# Patient Record
Sex: FEMALE | Race: WHITE | NOT HISPANIC OR LATINO | Employment: OTHER | URBAN - METROPOLITAN AREA
[De-identification: names, ages, dates, MRNs, and addresses within clinical notes are randomized per-mention and may not be internally consistent; named-entity substitution may affect disease eponyms.]

---

## 2018-02-02 ENCOUNTER — OFFICE VISIT (OUTPATIENT)
Dept: ORTHOPEDIC SURGERY | Facility: CLINIC | Age: 78
End: 2018-02-02

## 2018-02-02 VITALS — BODY MASS INDEX: 29.59 KG/M2 | WEIGHT: 160.8 LBS | TEMPERATURE: 98 F | HEIGHT: 62 IN

## 2018-02-02 DIAGNOSIS — M25.551 HIP PAIN, RIGHT: Primary | ICD-10-CM

## 2018-02-02 DIAGNOSIS — M16.11 PRIMARY OSTEOARTHRITIS OF RIGHT HIP: ICD-10-CM

## 2018-02-02 PROCEDURE — 73502 X-RAY EXAM HIP UNI 2-3 VIEWS: CPT | Performed by: ORTHOPAEDIC SURGERY

## 2018-02-02 PROCEDURE — 99204 OFFICE O/P NEW MOD 45 MIN: CPT | Performed by: ORTHOPAEDIC SURGERY

## 2018-02-02 RX ORDER — HYDROCODONE BITARTRATE AND ACETAMINOPHEN 7.5; 325 MG/1; MG/1
1 TABLET ORAL
COMMUNITY
Start: 2017-12-19 | End: 2018-04-12

## 2018-02-02 RX ORDER — PRAVASTATIN SODIUM 40 MG
40 TABLET ORAL NIGHTLY
COMMUNITY
Start: 2017-11-06

## 2018-02-02 RX ORDER — INSULIN GLARGINE 100 [IU]/ML
19 INJECTION, SOLUTION SUBCUTANEOUS
COMMUNITY
Start: 2017-09-06 | End: 2018-02-02 | Stop reason: SDUPTHER

## 2018-02-02 NOTE — PROGRESS NOTES
Patient: Kyler Horne  YOB: 1940 78 y.o. female  Medical Record Number: [id number]    Chief Complaints:   Chief Complaint   Patient presents with   • Right Hip - Establish Care, Pain       History of Present Illness:Kyler Horne is a 78 y.o. female who presents with  Complaints of severe intermittent and moderately severe constant right groin pain.  It has been ongoing now for about 2 years it has progressively worsened over the last few months.  She describes a stabbing aching pain within the groin.  He has trouble putting shoes and socks on getting in and out of a car.  She now can only walk short distances.  She is using a cane due to the pain.  She is seen Dr. Jeff Hough who diagnosed her with lumbar spinal stenosis and sent her for lumbar epidurals without relief.  She also saw Kong Howell at [facility] for physical therapy.  Despite these measures she still has had persistent worsening pain which limits her basic activities of daily living.    Allergies:   Allergies   Allergen Reactions   • Clavulanic Acid Itching       Medications:   Current Outpatient Prescriptions   Medication Sig Dispense Refill   • Acetaminophen (TYLENOL EXTRA STRENGTH PO) Take  by mouth.     • amLODIPine-benazepril (LOTREL 5-10) 5-10 MG per capsule Take 1 capsule by mouth daily.     • carvedilol (COREG) 3.125 MG tablet Take 3.125 mg by mouth 2 (two) times a day with meals.     • Cholecalciferol 1000 units chewable tablet Chew 5,000 Units.     • colesevelam (WELCHOL) 3.75 G pack pack Take  by mouth daily.     • HYDROcodone-acetaminophen (NORCO) 7.5-325 MG per tablet Take 1 tablet by mouth.     • insulin glargine (LANTUS) 100 UNIT/ML injection Inject  under the skin daily.     • levothyroxine (SYNTHROID, LEVOTHROID) 100 MCG tablet Take 100 mcg by mouth daily.     • lisinopril-hydrochlorothiazide (PRINZIDE,ZESTORETIC) 20-12.5 MG per tablet Take 1 tablet by mouth daily.     • loperamide (IMODIUM) 1 MG/5ML solution Take  by  "mouth 4 (four) times a day as needed for diarrhea.     • pantoprazole (PROTONIX) 40 MG EC tablet Take 40 mg by mouth daily.     • pravastatin (PRAVACHOL) 40 MG tablet      • warfarin (COUMADIN) 2.5 MG tablet Take 2.5 mg by mouth daily.       No current facility-administered medications for this visit.          The following portions of the patient's history were reviewed and updated as appropriate: allergies, current medications, past family history, past medical history, past social history, past surgical history and problem list.    Review of Systems:   A 14 point review of systems was performed. All systems negative except pertinent positives/negative listed in HPI above    Physical Exam:   Vitals:    02/02/18 1450   Temp: 98 °F (36.7 °C)   TempSrc: Temporal Artery    Weight: 72.9 kg (160 lb 12.8 oz)   Height: 157.5 cm (62\")       General: A and O x 3, ASA, NAD    SCLERA:    Normal    DENTITION:   Normal   Hip:  right    LEG ALIGNMENT:     Neutral        LEG LENGTH DISCREPANCY   :    none    GAIT:     Antalgic    SKIN:     No abnormality    RANGE OF MOTION:     Limited by joint irritability    STRENGTH:     Limited by joint irratibility    DISTAL PULSES:    Paplable    DISTAL SENSATION :   Intact    LYMPHATICS:     No   lymphadenopathy    OTHER:          +   Stinchfeld test      -    log roll      -   Tenderness to palpation trochanteric bursa       Radiology:  Xrays 2views right hip (AP bilateral hips, and lateral of the hip) ordered and reviewed for evaluation of hip pain  demonstrating  advanced, end-satge osteoarthritis with bone on bone articluation, periarticular osteophytes, and subchondral cysts. There are no previous films for comparision.    Assessment/Plan: Severe end-stage right hip osteoarthritis she has failed the full complement of conservative measures.  Continuation of conservative management vs. YOVANY discussed.  The patient wishes to proceed with total hip replacement.  At this point the patient " has failed the full gamut of conservative treatment and stating complete understanding of the risks/benefits/ anternatives wishes to proceed with surgical treatment.    Risk and benefits of surgery were reviewed.  Including, but not limited to, blood clots, anesthesia risk, infection, leg length discrepancy, fracture, skin/leg numbness, failure of the implant, need for future surgeries, continued pain, hematoma, need for transfusion, and death, among others.  The patient understands and wishes to proceed.     The spectrum of treatment options were discussed with the patient in detail including both the nonoperative and operative treatment modalities and their respective risks and benefits.  After thorough discussion, the patient has elected to undergo surgical treatment.  The details of the surgical procedure were explained including the location of probable incisions and a description of the likely implants to be used.  Models and diagrams were used as educational resources. The patient understands the likely convalescence after surgery, as well as the rehabilitation required.  We thoroughly discussed the risks, benefits, and alternatives to surgery.  The risks include but are not limited to the risk of infection, joint stiffness, blood clots (including DVT and/or pulmonary embolus along with the risk of death), neurologic and/or vascular injury, fracture, dislocation, nonunion, malunion, need for further surgery including hardware failure requiring revision, and continued pain.  It was explained that if tissue has been repaired or reconstructed, there is also a chance of failure which may require further management.  Following the completion of the discussion, the patient expressed understanding of this planned course of care, all their questions were answered and consent will be obtained preoperatively.    Operative Plan: Posterior approach Total Hip Replacement given her history of diabetes and very soft thin  skin in the anterior hip region.  I plan on 2 day hospital stay given her history of clotting within her small bowel.  I will plan on sending her home after 2 days  with home health rehab.  We would ultrasound her legs prior to discharge home and she'll be discharged on Coumadin.  She will call back when she is ready to schedule.  I've asked that she see Dr. Reich for medical clearance and also clearance to come off Coumadin for 5 days preop.        Del Dumont MD  2/2/2018

## 2018-03-22 ENCOUNTER — TELEPHONE (OUTPATIENT)
Dept: ORTHOPEDIC SURGERY | Facility: CLINIC | Age: 78
End: 2018-03-22

## 2018-03-28 ENCOUNTER — TELEPHONE (OUTPATIENT)
Dept: ORTHOPEDIC SURGERY | Facility: CLINIC | Age: 78
End: 2018-03-28

## 2018-03-30 ENCOUNTER — TELEPHONE (OUTPATIENT)
Dept: ORTHOPEDIC SURGERY | Facility: CLINIC | Age: 78
End: 2018-03-30

## 2018-03-30 DIAGNOSIS — M16.11 PRIMARY OSTEOARTHRITIS OF RIGHT HIP: Primary | ICD-10-CM

## 2018-03-30 RX ORDER — PREGABALIN 25 MG/1
150 CAPSULE ORAL ONCE
Status: CANCELLED | OUTPATIENT
Start: 2018-03-30 | End: 2018-03-30

## 2018-03-30 RX ORDER — MELOXICAM 7.5 MG/1
15 TABLET ORAL ONCE
Status: CANCELLED | OUTPATIENT
Start: 2018-03-30 | End: 2018-03-30

## 2018-04-03 PROBLEM — M16.11 PRIMARY OSTEOARTHRITIS OF RIGHT HIP: Status: ACTIVE | Noted: 2018-04-03

## 2018-04-12 ENCOUNTER — APPOINTMENT (OUTPATIENT)
Dept: PREADMISSION TESTING | Facility: HOSPITAL | Age: 78
End: 2018-04-12

## 2018-04-12 VITALS
DIASTOLIC BLOOD PRESSURE: 81 MMHG | SYSTOLIC BLOOD PRESSURE: 148 MMHG | OXYGEN SATURATION: 95 % | TEMPERATURE: 98.2 F | HEIGHT: 62 IN | RESPIRATION RATE: 16 BRPM | HEART RATE: 73 BPM | WEIGHT: 165 LBS | BODY MASS INDEX: 30.36 KG/M2

## 2018-04-12 DIAGNOSIS — M16.11 PRIMARY OSTEOARTHRITIS OF RIGHT HIP: ICD-10-CM

## 2018-04-12 LAB
ANION GAP SERPL CALCULATED.3IONS-SCNC: 12.9 MMOL/L
BACTERIA UR QL AUTO: ABNORMAL /HPF
BILIRUB UR QL STRIP: NEGATIVE
BUN BLD-MCNC: 20 MG/DL (ref 8–23)
BUN/CREAT SERPL: 21.1 (ref 7–25)
CALCIUM SPEC-SCNC: 9.2 MG/DL (ref 8.6–10.5)
CHLORIDE SERPL-SCNC: 104 MMOL/L (ref 98–107)
CLARITY UR: CLEAR
CO2 SERPL-SCNC: 27.1 MMOL/L (ref 22–29)
COLOR UR: YELLOW
CREAT BLD-MCNC: 0.95 MG/DL (ref 0.57–1)
DEPRECATED RDW RBC AUTO: 54.4 FL (ref 37–54)
ERYTHROCYTE [DISTWIDTH] IN BLOOD BY AUTOMATED COUNT: 14.2 % (ref 11.7–13)
GFR SERPL CREATININE-BSD FRML MDRD: 57 ML/MIN/1.73
GLUCOSE BLD-MCNC: 177 MG/DL (ref 65–99)
GLUCOSE UR STRIP-MCNC: NEGATIVE MG/DL
HCT VFR BLD AUTO: 48.8 % (ref 35.6–45.5)
HGB BLD-MCNC: 15.1 G/DL (ref 11.9–15.5)
HGB UR QL STRIP.AUTO: NEGATIVE
HYALINE CASTS UR QL AUTO: ABNORMAL /LPF
INR PPP: 1.96 (ref 0.9–1.1)
KETONES UR QL STRIP: NEGATIVE
LEUKOCYTE ESTERASE UR QL STRIP.AUTO: ABNORMAL
MCH RBC QN AUTO: 32.4 PG (ref 26.9–32)
MCHC RBC AUTO-ENTMCNC: 30.9 G/DL (ref 32.4–36.3)
MCV RBC AUTO: 104.7 FL (ref 80.5–98.2)
NITRITE UR QL STRIP: NEGATIVE
PH UR STRIP.AUTO: <=5 [PH] (ref 5–8)
PLATELET # BLD AUTO: 400 10*3/MM3 (ref 140–500)
PMV BLD AUTO: 10.6 FL (ref 6–12)
POTASSIUM BLD-SCNC: 4 MMOL/L (ref 3.5–5.2)
PROT UR QL STRIP: NEGATIVE
PROTHROMBIN TIME: 22 SECONDS (ref 11.7–14.2)
RBC # BLD AUTO: 4.66 10*6/MM3 (ref 3.9–5.2)
RBC # UR: ABNORMAL /HPF
REF LAB TEST METHOD: ABNORMAL
SODIUM BLD-SCNC: 144 MMOL/L (ref 136–145)
SP GR UR STRIP: 1.01 (ref 1–1.03)
SQUAMOUS #/AREA URNS HPF: ABNORMAL /HPF
UROBILINOGEN UR QL STRIP: ABNORMAL
WBC NRBC COR # BLD: 10.81 10*3/MM3 (ref 4.5–10.7)
WBC UR QL AUTO: ABNORMAL /HPF

## 2018-04-12 PROCEDURE — 85027 COMPLETE CBC AUTOMATED: CPT | Performed by: ORTHOPAEDIC SURGERY

## 2018-04-12 PROCEDURE — 81001 URINALYSIS AUTO W/SCOPE: CPT | Performed by: ORTHOPAEDIC SURGERY

## 2018-04-12 PROCEDURE — 87086 URINE CULTURE/COLONY COUNT: CPT | Performed by: ORTHOPAEDIC SURGERY

## 2018-04-12 PROCEDURE — 87186 SC STD MICRODIL/AGAR DIL: CPT | Performed by: ORTHOPAEDIC SURGERY

## 2018-04-12 PROCEDURE — 85610 PROTHROMBIN TIME: CPT | Performed by: ORTHOPAEDIC SURGERY

## 2018-04-12 PROCEDURE — 36415 COLL VENOUS BLD VENIPUNCTURE: CPT

## 2018-04-12 PROCEDURE — 80048 BASIC METABOLIC PNL TOTAL CA: CPT | Performed by: ORTHOPAEDIC SURGERY

## 2018-04-12 RX ORDER — COLESEVELAM 180 1/1
625 TABLET ORAL DAILY
COMMUNITY

## 2018-04-12 RX ORDER — AMLODIPINE BESYLATE 5 MG/1
5 TABLET ORAL EVERY MORNING
COMMUNITY

## 2018-04-12 RX ORDER — KRILL/OM-3/DHA/EPA/PHOSPHO/AST 500MG-86MG
500 CAPSULE ORAL DAILY
COMMUNITY
End: 2018-04-29 | Stop reason: HOSPADM

## 2018-04-12 RX ORDER — GLUCOSAMINE/D3/BOSWELLIA SERRA 1500MG-400
10000 TABLET ORAL DAILY
COMMUNITY
End: 2018-04-29 | Stop reason: HOSPADM

## 2018-04-12 RX ORDER — SODIUM PHOSPHATE,MONO-DIBASIC 19G-7G/118
1 ENEMA (ML) RECTAL 2 TIMES DAILY WITH MEALS
COMMUNITY
End: 2018-04-29 | Stop reason: HOSPADM

## 2018-04-12 ASSESSMENT — HOOS JR
HOOS JR SCORE: 13
HOOS JR SCORE: 49.858

## 2018-04-12 NOTE — DISCHARGE INSTRUCTIONS
PLEASE ARRIVE AT 8:00 AM ON 4/27/2018      Take the following medications the morning of surgery with a small sip of water:  AMLODIPINE, CARVEDILOL, PROTONIX      General Instructions:  • Do not eat solid food after midnight the night before surgery.  • You may drink clear liquids day of surgery but must stop at least one hour before your hospital arrival time.  • It is beneficial for you to have a clear drink that contains carbohydrates the day of surgery.  We suggest a 12 to 20 ounce bottle of Gatorade or Powerade for non-diabetic patients or a 12 to 20 ounce bottle of G2 or Powerade Zero for diabetic patients. (Pediatric patients, are not advised to drink a 12 to 20 ounce carbohydrate drink)    Clear liquids are liquids you can see through.  Nothing red in color.     Plain water                               Sports drinks  Sodas                                   Gelatin (Jell-O)  Fruit juices without pulp such as white grape juice and apple juice  Popsicles that contain no fruit or yogurt  Tea or coffee (no cream or milk added)  Gatorade / Powerade  G2 / Powerade Zero    • Infants may have breast milk up to four hours before surgery.  • Infants drinking formula may drink formula up to six hours before surgery.   • Patients who avoid smoking, chewing tobacco and alcohol for 4 weeks prior to surgery have a reduced risk of post-operative complications.  Quit smoking as many days before surgery as you can.  • Do not smoke, use chewing tobacco or drink alcohol the day of surgery.   • If applicable bring your C-PAP/ BI-PAP machine.  • Bring any papers given to you in the doctor’s office.  • Wear clean comfortable clothes and socks.  • Do not wear contact lenses or make-up.  Bring a case for your glasses.   • Bring crutches or walker if applicable.  • Remove all piercings.  Leave jewelry and any other valuables at home.  • Hair extensions with metal clips must be removed prior to surgery.  • The Pre-Admission Testing  nurse will instruct you to bring medications if unable to obtain an accurate list in Pre-Admission Testing.        If you were given a blood bank ID arm band remember to bring it with you the day of surgery.    Preventing a Surgical Site Infection:  • For 2 to 3 days before surgery, avoid shaving with a razor because the razor can irritate skin and make it easier to develop an infection.  • The night prior to surgery sleep in a clean bed with clean clothing.  Do not allow pets to sleep with you.  • Shower on the morning of surgery using a fresh bar of anti-bacterial soap (such as Dial) and clean washcloth.  Dry with a clean towel and dress in clean clothing.  • Ask your surgeon if you will be receiving antibiotics prior to surgery.  • Make sure you, your family, and all healthcare providers clean their hands with soap and water or an alcohol based hand  before caring for you or your wound.    Day of surgery:  Upon arrival, a Pre-op nurse and Anesthesiologist will review your health history, obtain vital signs, and answer questions you may have.  The only belongings needed at this time will be your home medications and if applicable your C-PAP/BI-PAP machine.  If you are staying overnight your family can leave the rest of your belongings in the car and bring them to your room later.  A Pre-op nurse will start an IV and you may receive medication in preparation for surgery, including something to help you relax.  Your family will be able to see you in the Pre-op area.  While you are in surgery your family should notify the waiting room  if they leave the waiting room area and provide a contact phone number.    Please be aware that surgery does come with discomfort.  We want to make every effort to control your discomfort so please discuss any uncontrolled symptoms with your nurse.   Your doctor will most likely have prescribed pain medications.      If you are going home after surgery you will  receive individualized written care instructions before being discharged.  A responsible adult must drive you to and from the hospital on the day of your surgery and stay with you for 24 hours.    If you are staying overnight following surgery, you will be transported to your hospital room following the recovery period.  [facility] has all private rooms.    If you have any questions please call Pre-Admission Testing at [phone].  Deductibles and co-payments are collected on the day of service. Please be prepared to pay the required co-pay, deductible or deposit on the day of service as defined by your plan.    2% CHLORAHEXIDINE GLUCONATE* CLOTH  Preparing or “prepping” skin before surgery can reduce the risk of infection at the surgical site. To make the process easier, [facility] has chosen disposable cloths moistened with a rinse-free, 2% Chlorhexidine Gluconate (CHG) antiseptic solution. The steps below outline the prepping process and should be carefully followed.        Use the prep cloth on the area that is circled in the diagram             Directions Night before Surgery  1) Shower using a fresh bar of anti-bacterial soap (such as Dial) and clean washcloth.  Use a clean towel to completely dry your skin.  2) Do not use any lotions, oils or creams on your skin.  3) Open the package and remove 1 cloth, wipe your skin for 30 seconds in a circular motion.  Allow to dry for 3 minutes.  4) Repeat #3 with second cloth.  5) Do not touch your eyes, ears, or mouth with the prep cloth.  6) Allow the wet prep solution to air dry.  7) Discard the prep cloth and wash your hands with soap and water.   8) Dress in clean bed clothes and sleep on fresh clean bed sheets.   9) You may experience some temporary itching after the prep.    Directions Day of Surgery  1) Repeat steps 1,2,3,4,5,6,7, and 9.   2) Dress in clean clothes before coming to the hospital.    BACTROBAN NASAL OINTMENT  There are  many germs normally in your nose. Bactroban is an ointment that will help reduce these germs. Please follow these instructions for Bactroban use:      _1ST___The day before surgery in the morning  Date___4/26_____    __2ND__The day before surgery in the evening              Date__4/26______    _3RD___The day of surgery in the morning    Date__4/27______    **Squirt ½ package of Bactroban Ointment onto a cotton applicator and apply to inside of 1st nostril.  Squirt the remaining Bactroban and apply to the inside of the other nostril.

## 2018-04-13 ENCOUNTER — TELEPHONE (OUTPATIENT)
Dept: ORTHOPEDIC SURGERY | Facility: CLINIC | Age: 78
End: 2018-04-13

## 2018-04-13 RX ORDER — SULFAMETHOXAZOLE AND TRIMETHOPRIM 800; 160 MG/1; MG/1
1 TABLET ORAL 2 TIMES DAILY
Qty: 14 TABLET | Refills: 0 | Status: SHIPPED | OUTPATIENT
Start: 2018-04-13 | End: 2018-04-29 | Stop reason: HOSPADM

## 2018-04-13 NOTE — TELEPHONE ENCOUNTER
----- Message from SARAH Brown sent at 4/13/2018  1:50 PM EDT -----  You please let patient know that she has a period in her urine and initial culture shows possible infection.  Please call and prescription for Bactrim DS 1 by mouth twice a day #14 and we will call her at the beginning of the week if we need to change antibiotic based on sensitivities.

## 2018-04-16 LAB
BACTERIA SPEC AEROBE CULT: ABNORMAL
BACTERIA SPEC AEROBE CULT: ABNORMAL

## 2018-04-17 ENCOUNTER — TELEPHONE (OUTPATIENT)
Dept: ORTHOPEDIC SURGERY | Facility: CLINIC | Age: 78
End: 2018-04-17

## 2018-04-17 RX ORDER — CIPROFLOXACIN 500 MG/1
500 TABLET, FILM COATED ORAL EVERY 12 HOURS SCHEDULED
Qty: 10 TABLET | Refills: 0 | Status: SHIPPED | OUTPATIENT
Start: 2018-04-17 | End: 2018-04-22

## 2018-04-17 NOTE — TELEPHONE ENCOUNTER
Final urine culture shows greater than 100,000 colony count of Escherichia coli not have a sensitivity to the Bactrim. SARAH Batista is recommending that she stop the Bactrim and will start her on Cipro 500 mg twice a day for 5 days.  Her prescription and has been sent to [facility] pharmacy at [phone] for Cipro 500 mg, #10, directions her to take 1 by mouth twice a day.

## 2018-04-17 NOTE — TELEPHONE ENCOUNTER
----- Message from SARAH Brown sent at 4/17/2018 11:41 AM EDT -----  It looks like that the patient's infection will not be cleared with the Bactrim that we called and based on her sensitivities studies.  Instead she needs to stop the Bactrim and we will change her to Cipro 500 mg 1 by mouth 3 times a day #10 0 refills.  Thank you please let patient know and call and prescription.

## 2018-04-18 ENCOUNTER — TELEPHONE (OUTPATIENT)
Dept: ORTHOPEDIC SURGERY | Facility: CLINIC | Age: 78
End: 2018-04-18

## 2018-04-19 ENCOUNTER — OFFICE VISIT (OUTPATIENT)
Dept: ORTHOPEDIC SURGERY | Facility: CLINIC | Age: 78
End: 2018-04-19

## 2018-04-19 VITALS
TEMPERATURE: 97.8 F | WEIGHT: 161.8 LBS | BODY MASS INDEX: 30.55 KG/M2 | SYSTOLIC BLOOD PRESSURE: 140 MMHG | DIASTOLIC BLOOD PRESSURE: 90 MMHG | HEIGHT: 61 IN

## 2018-04-19 DIAGNOSIS — M16.11 PRIMARY OSTEOARTHRITIS OF RIGHT HIP: Primary | ICD-10-CM

## 2018-04-19 PROCEDURE — S0260 H&P FOR SURGERY: HCPCS | Performed by: NURSE PRACTITIONER

## 2018-04-19 NOTE — H&P
Patient: Kyler Horne    Date of Admission: 4/27/18    YOB: 1940    Medical Record Number: [id number]    Admitting Physician: Dr. Del Dumont    Reason for Admission: End Stage Right Hip OA    History of Present Illness: 78 y.o. female presents with severe end stage hip osteoarthritis which has not been responsive to the full compliment of conservative measures. Despite conservative attempts, there is still severe, constant activity limiting hip pain. Given the severity of the pain, the patient has elected to proceed with hip replacement.    Allergies:   Allergies   Allergen Reactions   • Clavulanic Acid Itching   • Hydrocodone GI Intolerance         Current Medications:  Scheduled Meds:  PRN Meds:.    PMH:  Past Medical History:   Diagnosis Date   • Diabetes mellitus    • Diarrhea    • Difficulty sleeping    • Disease of thyroid gland    • Easy bruising    • GERD (gastroesophageal reflux disease)    • History of heart attack 2011, 2013   • History of kidney stones    • History of skin cancer    • Hyperlipidemia    • Hypertension    • Irregular heartbeat    • Joint pain    • Knee pain, left    • Loss of hair    • Loss of hearing    • Muscle pain    • Osteoarthritis    • Ringing in ear    • Sleep apnea     CPAP   • Stiffness of left hip, not elsewhere classified     right side        PSURGH:  Past Surgical History:   Procedure Laterality Date   • CHOLECYSTECTOMY     • COLON RESECTION  2011   • DILATATION AND CURETTAGE     • SKIN CANCER EXCISION     • TONSILLECTOMY         SocialHx:  Social History     Occupational History   • Not on file.     Social History Main Topics   • Smoking status: Former Smoker     Packs/day: 0.25     Years: 12.00     Types: Cigarettes   • Smokeless tobacco: Never Used      Comment: QUIT OVER 50 YRS AGO   • Alcohol use No   • Drug use: No   • Sexual activity: Defer    Social History     Social History Narrative   • No narrative on file       FamHx:  Family History   Problem  "Relation Age of Onset   • Breast cancer Mother    • Hypertension Mother    • Diabetes Mother    • Heart disease Father    • Diabetes Father    • Stroke Sister    • Lung cancer Sister    • Lung disease Sister    • Diabetes Sister    • Malig Hyperthermia Neg Hx          Review of Systems:   A 14 point review of systems was performed, pertinent positives discussed above, all other systems are negative    Physical Exam: 78 y.o. female  Vital Signs :   Vitals:    04/19/18 1554   BP: 140/90   BP Location: Left arm   Patient Position: Sitting   Cuff Size: Adult   Temp: 97.8 °F (36.6 °C)   TempSrc: Temporal Artery    Weight: 73.4 kg (161 lb 12.8 oz)   Height: 154.9 cm (61\")     General: Alert and Oriented x 3, No acute distress.  Psych: mood and affect appropriate; recent and remote memory intact  Eyes: conjunctiva clear; pupils equally round and reactive, sclera nonicteric  CV: no peripheral edema  Resp: normal respiratory effort  Skin: no rashes or wounds; normal turgor  Musculosketetal; pain with hip range of motion. Positive stinchfeld test. No trochanteric  Tenderness.  Vascular: palpable distal pulses    Labs:    Appointment on 04/12/2018   Component Date Value Ref Range Status   • Glucose 04/12/2018 177* 65 - 99 mg/dL Final   • BUN 04/12/2018 20  8 - 23 mg/dL Final   • Creatinine 04/12/2018 0.95  0.57 - 1.00 mg/dL Final   • Sodium 04/12/2018 144  136 - 145 mmol/L Final   • Potassium 04/12/2018 4.0  3.5 - 5.2 mmol/L Final   • Chloride 04/12/2018 104  98 - 107 mmol/L Final   • CO2 04/12/2018 27.1  22.0 - 29.0 mmol/L Final   • Calcium 04/12/2018 9.2  8.6 - 10.5 mg/dL Final   • eGFR Non African Amer 04/12/2018 57* >60 mL/min/1.73 Final   • BUN/Creatinine Ratio 04/12/2018 21.1  7.0 - 25.0 Final   • Anion Gap 04/12/2018 12.9  mmol/L Final   • WBC 04/12/2018 10.81* 4.50 - 10.70 10*3/mm3 Final   • RBC 04/12/2018 4.66  3.90 - 5.20 10*6/mm3 Final   • Hemoglobin 04/12/2018 15.1  11.9 - 15.5 g/dL Final   • Hematocrit 04/12/2018 " 48.8* 35.6 - 45.5 % Final   • MCV 04/12/2018 104.7* 80.5 - 98.2 fL Final   • MCH 04/12/2018 32.4* 26.9 - 32.0 pg Final   • MCHC 04/12/2018 30.9* 32.4 - 36.3 g/dL Final   • RDW 04/12/2018 14.2* 11.7 - 13.0 % Final   • RDW-SD 04/12/2018 54.4* 37.0 - 54.0 fl Final   • MPV 04/12/2018 10.6  6.0 - 12.0 fL Final   • Platelets 04/12/2018 400  140 - 500 10*3/mm3 Final   • Protime 04/12/2018 22.0* 11.7 - 14.2 Seconds Final   • INR 04/12/2018 1.96* 0.90 - 1.10 Final   • Color, UA 04/12/2018 Yellow  Yellow, Straw Final   • Appearance, UA 04/12/2018 Clear  Clear Final   • pH, UA 04/12/2018 <=5.0  5.0 - 8.0 Final   • Specific Gravity, UA 04/12/2018 1.014  1.005 - 1.030 Final   • Glucose, UA 04/12/2018 Negative  Negative Final   • Ketones, UA 04/12/2018 Negative  Negative Final   • Bilirubin, UA 04/12/2018 Negative  Negative Final   • Blood, UA 04/12/2018 Negative  Negative Final   • Protein, UA 04/12/2018 Negative  Negative Final   • Leuk Esterase, UA 04/12/2018 Trace* Negative Final   • Nitrite, UA 04/12/2018 Negative  Negative Final   • Urobilinogen, UA 04/12/2018 0.2 E.U./dL  0.2 - 1.0 E.U./dL Final   • RBC, UA 04/12/2018 0-2  None Seen, 0-2 /HPF Final   • WBC, UA 04/12/2018 6-12* None Seen, 0-2 /HPF Final   • Bacteria, UA 04/12/2018 4+* None Seen /HPF Final   • Squamous Epithelial Cells, UA 04/12/2018 0-2  None Seen, 0-2 /HPF Final   • Hyaline Casts, UA 04/12/2018 0-2  None Seen /LPF Final   • Methodology 04/12/2018 Automated Microscopy   Final   • Urine Culture 04/16/2018 >100,000 CFU/mL Escherichia coli*  Final       Xrays:  Xrays AP pelvis and a lateral of the Right hip were reviewed demonstrating  End stage hip OA with bone on bone articulation, subchondral cysts and periarticular osteophytes.    Assessment:  End-stage Right hip osteoarthritis. Conservative measures have failed.      Plan:  The plan is to proceed with Right Total Hip Replacement. The patient voiced understanding of the risks, benefits, and alternative  forms of treatment that were discussed with Dr Dumont at the time of scheduling.  Patient is planning on going home with home health after 2 day stay.  We will resume her Coumadin post operatively and also consider a Lovenox bridge until she is therapeutic given the fact she also has history of DVT.  T acid to be given locally in OR.  Also she is unable to tolerate hydrocodone and so we have discussed the possibility of using all TRAM versus low dose Dilaudid by mouth post operatively for pain    Samara Yen, APRN  4/19/2018

## 2018-04-19 NOTE — TELEPHONE ENCOUNTER
Unfortunately the Bactrim did not cover the bacteria in her urine.  If she is not able to continue with the Cipro, and would recommend that she contact her primary care physician to see what else they would recommend.

## 2018-04-27 ENCOUNTER — ANESTHESIA (OUTPATIENT)
Dept: PERIOP | Facility: HOSPITAL | Age: 78
End: 2018-04-27

## 2018-04-27 ENCOUNTER — HOSPITAL ENCOUNTER (INPATIENT)
Facility: HOSPITAL | Age: 78
LOS: 2 days | Discharge: HOME HEALTH | End: 2018-04-29
Attending: ORTHOPAEDIC SURGERY | Admitting: ORTHOPAEDIC SURGERY

## 2018-04-27 ENCOUNTER — ANESTHESIA EVENT (OUTPATIENT)
Dept: PERIOP | Facility: HOSPITAL | Age: 78
End: 2018-04-27

## 2018-04-27 ENCOUNTER — APPOINTMENT (OUTPATIENT)
Dept: GENERAL RADIOLOGY | Facility: HOSPITAL | Age: 78
End: 2018-04-27

## 2018-04-27 DIAGNOSIS — M16.11 PRIMARY OSTEOARTHRITIS OF RIGHT HIP: ICD-10-CM

## 2018-04-27 PROBLEM — M16.9 OA (OSTEOARTHRITIS) OF HIP: Status: ACTIVE | Noted: 2018-04-27

## 2018-04-27 LAB
ABO GROUP BLD: NORMAL
BLD GP AB SCN SERPL QL: NEGATIVE
GLUCOSE BLDC GLUCOMTR-MCNC: 124 MG/DL (ref 70–130)
GLUCOSE BLDC GLUCOMTR-MCNC: 155 MG/DL (ref 70–130)
GLUCOSE BLDC GLUCOMTR-MCNC: 198 MG/DL (ref 70–130)
GLUCOSE BLDC GLUCOMTR-MCNC: 216 MG/DL (ref 70–130)
INR PPP: 1.06 (ref 0.9–1.1)
PROTHROMBIN TIME: 13.6 SECONDS (ref 11.7–14.2)
RH BLD: POSITIVE
T&S EXPIRATION DATE: NORMAL

## 2018-04-27 PROCEDURE — 25010000002 ONDANSETRON PER 1 MG: Performed by: NURSE PRACTITIONER

## 2018-04-27 PROCEDURE — 25010000002 MIDAZOLAM PER 1 MG: Performed by: ANESTHESIOLOGY

## 2018-04-27 PROCEDURE — 97110 THERAPEUTIC EXERCISES: CPT

## 2018-04-27 PROCEDURE — 25010000002 PROPOFOL 10 MG/ML EMULSION: Performed by: ANESTHESIOLOGY

## 2018-04-27 PROCEDURE — 86901 BLOOD TYPING SEROLOGIC RH(D): CPT | Performed by: ORTHOPAEDIC SURGERY

## 2018-04-27 PROCEDURE — 27130 TOTAL HIP ARTHROPLASTY: CPT | Performed by: ORTHOPAEDIC SURGERY

## 2018-04-27 PROCEDURE — 25010000002 FENTANYL CITRATE (PF) 100 MCG/2ML SOLUTION: Performed by: ANESTHESIOLOGY

## 2018-04-27 PROCEDURE — 27130 TOTAL HIP ARTHROPLASTY: CPT | Performed by: NURSE PRACTITIONER

## 2018-04-27 PROCEDURE — 25010000002 DEXAMETHASONE PER 1 MG: Performed by: ANESTHESIOLOGY

## 2018-04-27 PROCEDURE — 97161 PT EVAL LOW COMPLEX 20 MIN: CPT

## 2018-04-27 PROCEDURE — 73501 X-RAY EXAM HIP UNI 1 VIEW: CPT

## 2018-04-27 PROCEDURE — 25010000003 CEFAZOLIN IN DEXTROSE 2-4 GM/100ML-% SOLUTION: Performed by: NURSE PRACTITIONER

## 2018-04-27 PROCEDURE — 25010000002 PHENYLEPHRINE PER 1 ML: Performed by: ANESTHESIOLOGY

## 2018-04-27 PROCEDURE — 25010000002 ONDANSETRON PER 1 MG: Performed by: ANESTHESIOLOGY

## 2018-04-27 PROCEDURE — 85610 PROTHROMBIN TIME: CPT | Performed by: ORTHOPAEDIC SURGERY

## 2018-04-27 PROCEDURE — 82962 GLUCOSE BLOOD TEST: CPT

## 2018-04-27 PROCEDURE — 25010000003 CEFAZOLIN IN DEXTROSE 2-4 GM/100ML-% SOLUTION: Performed by: ORTHOPAEDIC SURGERY

## 2018-04-27 PROCEDURE — 0SR906A REPLACEMENT OF RIGHT HIP JOINT WITH OXIDIZED ZIRCONIUM ON POLYETHYLENE SYNTHETIC SUBSTITUTE, UNCEMENTED, OPEN APPROACH: ICD-10-PCS | Performed by: ORTHOPAEDIC SURGERY

## 2018-04-27 PROCEDURE — C1776 JOINT DEVICE (IMPLANTABLE): HCPCS | Performed by: ORTHOPAEDIC SURGERY

## 2018-04-27 PROCEDURE — 25010000002 KETOROLAC TROMETHAMINE PER 15 MG: Performed by: NURSE PRACTITIONER

## 2018-04-27 PROCEDURE — 86900 BLOOD TYPING SEROLOGIC ABO: CPT | Performed by: ORTHOPAEDIC SURGERY

## 2018-04-27 PROCEDURE — 25010000002 VANCOMYCIN PER 500 MG: Performed by: ORTHOPAEDIC SURGERY

## 2018-04-27 PROCEDURE — 86850 RBC ANTIBODY SCREEN: CPT | Performed by: ORTHOPAEDIC SURGERY

## 2018-04-27 DEVICE — R3 3 HOLE ACETABULAR SHELL 50MM
Type: IMPLANTABLE DEVICE | Site: ACETABULUM | Status: FUNCTIONAL
Brand: R3 ACETABULAR

## 2018-04-27 DEVICE — OXINIUM FEMORAL HEAD 12/14 TAPER                                    32MM -3
Type: IMPLANTABLE DEVICE | Site: HIP | Status: FUNCTIONAL
Brand: OXINIUM

## 2018-04-27 DEVICE — R3 20 DEGREE XLPE ACETABULAR LINER                                    32MM INNER DIAMETER X OUTER DIAMETER 50MM
Type: IMPLANTABLE DEVICE | Site: ACETABULUM | Status: FUNCTIONAL
Brand: R3

## 2018-04-27 DEVICE — TOTL HIP STD SMITH NEPHEW: Type: IMPLANTABLE DEVICE | Site: ACETABULUM | Status: FUNCTIONAL

## 2018-04-27 DEVICE — REFLECTION SPHERICAL HEAD SCREW 20MM
Type: IMPLANTABLE DEVICE | Site: ACETABULUM | Status: FUNCTIONAL
Brand: REFLECTION

## 2018-04-27 DEVICE — POLARSTEM STANDARD NON-CEMENTED                                    WITH TI/HA 2
Type: IMPLANTABLE DEVICE | Site: HIP | Status: FUNCTIONAL
Brand: POLARSTEM

## 2018-04-27 DEVICE — REFLECTION SPHERICAL HEAD SCREW 30MM
Type: IMPLANTABLE DEVICE | Site: ACETABULUM | Status: FUNCTIONAL
Brand: REFLECTION

## 2018-04-27 RX ORDER — ROCURONIUM BROMIDE 10 MG/ML
INJECTION, SOLUTION INTRAVENOUS AS NEEDED
Status: DISCONTINUED | OUTPATIENT
Start: 2018-04-27 | End: 2018-04-27 | Stop reason: SURG

## 2018-04-27 RX ORDER — PROMETHAZINE HYDROCHLORIDE 25 MG/ML
12.5 INJECTION, SOLUTION INTRAMUSCULAR; INTRAVENOUS ONCE AS NEEDED
Status: DISCONTINUED | OUTPATIENT
Start: 2018-04-27 | End: 2018-04-27 | Stop reason: HOSPADM

## 2018-04-27 RX ORDER — PROMETHAZINE HYDROCHLORIDE 25 MG/1
25 SUPPOSITORY RECTAL ONCE AS NEEDED
Status: DISCONTINUED | OUTPATIENT
Start: 2018-04-27 | End: 2018-04-27 | Stop reason: HOSPADM

## 2018-04-27 RX ORDER — CEFAZOLIN SODIUM 2 G/100ML
2 INJECTION, SOLUTION INTRAVENOUS ONCE
Status: COMPLETED | OUTPATIENT
Start: 2018-04-27 | End: 2018-04-27

## 2018-04-27 RX ORDER — EPHEDRINE SULFATE 50 MG/ML
5 INJECTION, SOLUTION INTRAVENOUS ONCE AS NEEDED
Status: DISCONTINUED | OUTPATIENT
Start: 2018-04-27 | End: 2018-04-27 | Stop reason: HOSPADM

## 2018-04-27 RX ORDER — KETOROLAC TROMETHAMINE 30 MG/ML
15 INJECTION, SOLUTION INTRAMUSCULAR; INTRAVENOUS EVERY 8 HOURS
Status: COMPLETED | OUTPATIENT
Start: 2018-04-27 | End: 2018-04-28

## 2018-04-27 RX ORDER — MIDAZOLAM HYDROCHLORIDE 1 MG/ML
1 INJECTION INTRAMUSCULAR; INTRAVENOUS
Status: DISCONTINUED | OUTPATIENT
Start: 2018-04-27 | End: 2018-04-27 | Stop reason: HOSPADM

## 2018-04-27 RX ORDER — CHLORHEXIDINE GLUCONATE 2 G/100ML
1 SOLUTION TOPICAL TAKE AS DIRECTED
COMMUNITY
End: 2018-04-29 | Stop reason: HOSPADM

## 2018-04-27 RX ORDER — ONDANSETRON 2 MG/ML
INJECTION INTRAMUSCULAR; INTRAVENOUS AS NEEDED
Status: DISCONTINUED | OUTPATIENT
Start: 2018-04-27 | End: 2018-04-27 | Stop reason: SURG

## 2018-04-27 RX ORDER — LEVOTHYROXINE SODIUM 0.1 MG/1
100 TABLET ORAL
Status: DISCONTINUED | OUTPATIENT
Start: 2018-04-28 | End: 2018-04-29 | Stop reason: HOSPADM

## 2018-04-27 RX ORDER — MELOXICAM 15 MG/1
15 TABLET ORAL ONCE
Status: COMPLETED | OUTPATIENT
Start: 2018-04-27 | End: 2018-04-27

## 2018-04-27 RX ORDER — DIPHENHYDRAMINE HYDROCHLORIDE 50 MG/ML
12.5 INJECTION INTRAMUSCULAR; INTRAVENOUS
Status: DISCONTINUED | OUTPATIENT
Start: 2018-04-27 | End: 2018-04-27 | Stop reason: HOSPADM

## 2018-04-27 RX ORDER — FENTANYL CITRATE 50 UG/ML
50 INJECTION, SOLUTION INTRAMUSCULAR; INTRAVENOUS
Status: DISCONTINUED | OUTPATIENT
Start: 2018-04-27 | End: 2018-04-27 | Stop reason: HOSPADM

## 2018-04-27 RX ORDER — HYDROMORPHONE HYDROCHLORIDE 2 MG/1
2 TABLET ORAL
Status: DISCONTINUED | OUTPATIENT
Start: 2018-04-27 | End: 2018-04-29 | Stop reason: HOSPADM

## 2018-04-27 RX ORDER — PANTOPRAZOLE SODIUM 40 MG/1
40 TABLET, DELAYED RELEASE ORAL EVERY MORNING
Status: DISCONTINUED | OUTPATIENT
Start: 2018-04-28 | End: 2018-04-29 | Stop reason: HOSPADM

## 2018-04-27 RX ORDER — NALOXONE HCL 0.4 MG/ML
0.2 VIAL (ML) INJECTION AS NEEDED
Status: DISCONTINUED | OUTPATIENT
Start: 2018-04-27 | End: 2018-04-27 | Stop reason: HOSPADM

## 2018-04-27 RX ORDER — AMLODIPINE BESYLATE 5 MG/1
5 TABLET ORAL EVERY MORNING
Status: DISCONTINUED | OUTPATIENT
Start: 2018-04-28 | End: 2018-04-29 | Stop reason: HOSPADM

## 2018-04-27 RX ORDER — PROPOFOL 10 MG/ML
VIAL (ML) INTRAVENOUS AS NEEDED
Status: DISCONTINUED | OUTPATIENT
Start: 2018-04-27 | End: 2018-04-27 | Stop reason: SURG

## 2018-04-27 RX ORDER — PREGABALIN 75 MG/1
150 CAPSULE ORAL ONCE
Status: COMPLETED | OUTPATIENT
Start: 2018-04-27 | End: 2018-04-27

## 2018-04-27 RX ORDER — HYDROMORPHONE HCL 110MG/55ML
0.5 PATIENT CONTROLLED ANALGESIA SYRINGE INTRAVENOUS
Status: DISCONTINUED | OUTPATIENT
Start: 2018-04-27 | End: 2018-04-27 | Stop reason: HOSPADM

## 2018-04-27 RX ORDER — ACETAMINOPHEN 10 MG/ML
1000 INJECTION, SOLUTION INTRAVENOUS ONCE
Status: DISCONTINUED | OUTPATIENT
Start: 2018-04-27 | End: 2018-04-27 | Stop reason: HOSPADM

## 2018-04-27 RX ORDER — CARVEDILOL 3.12 MG/1
3.12 TABLET ORAL 2 TIMES DAILY WITH MEALS
Status: DISCONTINUED | OUTPATIENT
Start: 2018-04-27 | End: 2018-04-29 | Stop reason: HOSPADM

## 2018-04-27 RX ORDER — FLUMAZENIL 0.1 MG/ML
0.2 INJECTION INTRAVENOUS AS NEEDED
Status: DISCONTINUED | OUTPATIENT
Start: 2018-04-27 | End: 2018-04-27 | Stop reason: HOSPADM

## 2018-04-27 RX ORDER — MELOXICAM 7.5 MG/1
7.5 TABLET ORAL DAILY
Status: DISCONTINUED | OUTPATIENT
Start: 2018-04-28 | End: 2018-04-29 | Stop reason: HOSPADM

## 2018-04-27 RX ORDER — SODIUM CHLORIDE 0.9 % (FLUSH) 0.9 %
1-10 SYRINGE (ML) INJECTION AS NEEDED
Status: DISCONTINUED | OUTPATIENT
Start: 2018-04-27 | End: 2018-04-27 | Stop reason: HOSPADM

## 2018-04-27 RX ORDER — LIDOCAINE HYDROCHLORIDE 20 MG/ML
INJECTION, SOLUTION INFILTRATION; PERINEURAL AS NEEDED
Status: DISCONTINUED | OUTPATIENT
Start: 2018-04-27 | End: 2018-04-27 | Stop reason: SURG

## 2018-04-27 RX ORDER — ONDANSETRON 2 MG/ML
4 INJECTION INTRAMUSCULAR; INTRAVENOUS EVERY 6 HOURS PRN
Status: DISCONTINUED | OUTPATIENT
Start: 2018-04-27 | End: 2018-04-29 | Stop reason: HOSPADM

## 2018-04-27 RX ORDER — EPHEDRINE SULFATE 50 MG/ML
INJECTION, SOLUTION INTRAVENOUS AS NEEDED
Status: DISCONTINUED | OUTPATIENT
Start: 2018-04-27 | End: 2018-04-27 | Stop reason: SURG

## 2018-04-27 RX ORDER — TRANEXAMIC ACID 100 MG/ML
INJECTION, SOLUTION INTRAVENOUS AS NEEDED
Status: DISCONTINUED | OUTPATIENT
Start: 2018-04-27 | End: 2018-04-27 | Stop reason: HOSPADM

## 2018-04-27 RX ORDER — LIDOCAINE HYDROCHLORIDE 10 MG/ML
0.5 INJECTION, SOLUTION EPIDURAL; INFILTRATION; INTRACAUDAL; PERINEURAL ONCE AS NEEDED
Status: DISCONTINUED | OUTPATIENT
Start: 2018-04-27 | End: 2018-04-27 | Stop reason: HOSPADM

## 2018-04-27 RX ORDER — ACETAMINOPHEN 10 MG/ML
1000 INJECTION, SOLUTION INTRAVENOUS ONCE
Status: COMPLETED | OUTPATIENT
Start: 2018-04-27 | End: 2018-04-27

## 2018-04-27 RX ORDER — MAGNESIUM HYDROXIDE 1200 MG/15ML
LIQUID ORAL AS NEEDED
Status: DISCONTINUED | OUTPATIENT
Start: 2018-04-27 | End: 2018-04-27 | Stop reason: HOSPADM

## 2018-04-27 RX ORDER — DEXAMETHASONE SODIUM PHOSPHATE 10 MG/ML
INJECTION INTRAMUSCULAR; INTRAVENOUS AS NEEDED
Status: DISCONTINUED | OUTPATIENT
Start: 2018-04-27 | End: 2018-04-27 | Stop reason: SURG

## 2018-04-27 RX ORDER — HYDRALAZINE HYDROCHLORIDE 20 MG/ML
5 INJECTION INTRAMUSCULAR; INTRAVENOUS
Status: DISCONTINUED | OUTPATIENT
Start: 2018-04-27 | End: 2018-04-27 | Stop reason: HOSPADM

## 2018-04-27 RX ORDER — ONDANSETRON 4 MG/1
4 TABLET, FILM COATED ORAL EVERY 6 HOURS PRN
Status: DISCONTINUED | OUTPATIENT
Start: 2018-04-27 | End: 2018-04-29 | Stop reason: HOSPADM

## 2018-04-27 RX ORDER — PROMETHAZINE HYDROCHLORIDE 25 MG/1
12.5 TABLET ORAL ONCE AS NEEDED
Status: DISCONTINUED | OUTPATIENT
Start: 2018-04-27 | End: 2018-04-27 | Stop reason: HOSPADM

## 2018-04-27 RX ORDER — INSULIN GLARGINE 100 [IU]/ML
19 INJECTION, SOLUTION SUBCUTANEOUS NIGHTLY
Status: DISCONTINUED | OUTPATIENT
Start: 2018-04-27 | End: 2018-04-29 | Stop reason: HOSPADM

## 2018-04-27 RX ORDER — LABETALOL HYDROCHLORIDE 5 MG/ML
5 INJECTION, SOLUTION INTRAVENOUS
Status: DISCONTINUED | OUTPATIENT
Start: 2018-04-27 | End: 2018-04-27 | Stop reason: HOSPADM

## 2018-04-27 RX ORDER — DOCUSATE SODIUM 100 MG/1
100 CAPSULE, LIQUID FILLED ORAL 2 TIMES DAILY
Status: DISCONTINUED | OUTPATIENT
Start: 2018-04-27 | End: 2018-04-29 | Stop reason: HOSPADM

## 2018-04-27 RX ORDER — MIDAZOLAM HYDROCHLORIDE 1 MG/ML
2 INJECTION INTRAMUSCULAR; INTRAVENOUS
Status: DISCONTINUED | OUTPATIENT
Start: 2018-04-27 | End: 2018-04-27 | Stop reason: HOSPADM

## 2018-04-27 RX ORDER — VANCOMYCIN HYDROCHLORIDE 1 G/200ML
15 INJECTION, SOLUTION INTRAVENOUS ONCE
Status: COMPLETED | OUTPATIENT
Start: 2018-04-27 | End: 2018-04-27

## 2018-04-27 RX ORDER — FENTANYL CITRATE 50 UG/ML
25 INJECTION, SOLUTION INTRAMUSCULAR; INTRAVENOUS
Status: DISCONTINUED | OUTPATIENT
Start: 2018-04-27 | End: 2018-04-27 | Stop reason: HOSPADM

## 2018-04-27 RX ORDER — FAMOTIDINE 10 MG/ML
20 INJECTION, SOLUTION INTRAVENOUS ONCE
Status: COMPLETED | OUTPATIENT
Start: 2018-04-27 | End: 2018-04-27

## 2018-04-27 RX ORDER — ONDANSETRON 4 MG/1
4 TABLET, ORALLY DISINTEGRATING ORAL EVERY 6 HOURS PRN
Status: DISCONTINUED | OUTPATIENT
Start: 2018-04-27 | End: 2018-04-29 | Stop reason: HOSPADM

## 2018-04-27 RX ORDER — ONDANSETRON 2 MG/ML
4 INJECTION INTRAMUSCULAR; INTRAVENOUS ONCE AS NEEDED
Status: DISCONTINUED | OUTPATIENT
Start: 2018-04-27 | End: 2018-04-27 | Stop reason: HOSPADM

## 2018-04-27 RX ORDER — ACETAMINOPHEN 325 MG/1
325 TABLET ORAL EVERY 4 HOURS PRN
Status: DISCONTINUED | OUTPATIENT
Start: 2018-04-27 | End: 2018-04-29 | Stop reason: HOSPADM

## 2018-04-27 RX ORDER — PROMETHAZINE HYDROCHLORIDE 25 MG/1
25 TABLET ORAL ONCE AS NEEDED
Status: DISCONTINUED | OUTPATIENT
Start: 2018-04-27 | End: 2018-04-27 | Stop reason: HOSPADM

## 2018-04-27 RX ORDER — CEFAZOLIN SODIUM 2 G/100ML
2 INJECTION, SOLUTION INTRAVENOUS EVERY 8 HOURS
Status: COMPLETED | OUTPATIENT
Start: 2018-04-27 | End: 2018-04-28

## 2018-04-27 RX ORDER — SODIUM CHLORIDE, SODIUM LACTATE, POTASSIUM CHLORIDE, CALCIUM CHLORIDE 600; 310; 30; 20 MG/100ML; MG/100ML; MG/100ML; MG/100ML
9 INJECTION, SOLUTION INTRAVENOUS CONTINUOUS
Status: DISCONTINUED | OUTPATIENT
Start: 2018-04-27 | End: 2018-04-27 | Stop reason: HOSPADM

## 2018-04-27 RX ADMIN — CEFAZOLIN SODIUM 2 G: 2 INJECTION, SOLUTION INTRAVENOUS at 18:49

## 2018-04-27 RX ADMIN — FENTANYL CITRATE 75 MCG: 50 INJECTION INTRAMUSCULAR; INTRAVENOUS at 11:13

## 2018-04-27 RX ADMIN — VANCOMYCIN HYDROCHLORIDE 1000 MG: 1 INJECTION, SOLUTION INTRAVENOUS at 08:27

## 2018-04-27 RX ADMIN — FAMOTIDINE 20 MG: 10 INJECTION, SOLUTION INTRAVENOUS at 09:05

## 2018-04-27 RX ADMIN — ONDANSETRON 4 MG: 2 INJECTION INTRAMUSCULAR; INTRAVENOUS at 12:22

## 2018-04-27 RX ADMIN — EPHEDRINE SULFATE 5 MG: 50 INJECTION INTRAMUSCULAR; INTRAVENOUS; SUBCUTANEOUS at 11:56

## 2018-04-27 RX ADMIN — RIVAROXABAN 10 MG: 10 TABLET, FILM COATED ORAL at 18:01

## 2018-04-27 RX ADMIN — SUGAMMADEX 146 MG: 100 INJECTION, SOLUTION INTRAVENOUS at 12:25

## 2018-04-27 RX ADMIN — PHENYLEPHRINE HYDROCHLORIDE 100 MCG: 10 INJECTION INTRAVENOUS at 12:05

## 2018-04-27 RX ADMIN — PHENYLEPHRINE HYDROCHLORIDE 100 MCG: 10 INJECTION INTRAVENOUS at 11:30

## 2018-04-27 RX ADMIN — MELOXICAM 15 MG: 15 TABLET ORAL at 08:38

## 2018-04-27 RX ADMIN — CEFAZOLIN SODIUM 2 G: 2 INJECTION, SOLUTION INTRAVENOUS at 11:19

## 2018-04-27 RX ADMIN — PHENYLEPHRINE HYDROCHLORIDE 50 MCG: 10 INJECTION INTRAVENOUS at 11:56

## 2018-04-27 RX ADMIN — PREGABALIN 150 MG: 75 CAPSULE ORAL at 08:38

## 2018-04-27 RX ADMIN — KETOROLAC TROMETHAMINE 15 MG: 30 INJECTION, SOLUTION INTRAMUSCULAR at 20:46

## 2018-04-27 RX ADMIN — ONDANSETRON 4 MG: 2 INJECTION INTRAMUSCULAR; INTRAVENOUS at 15:38

## 2018-04-27 RX ADMIN — DEXAMETHASONE SODIUM PHOSPHATE 8 MG: 10 INJECTION INTRAMUSCULAR; INTRAVENOUS at 11:24

## 2018-04-27 RX ADMIN — FENTANYL CITRATE 50 MCG: 50 INJECTION INTRAMUSCULAR; INTRAVENOUS at 13:45

## 2018-04-27 RX ADMIN — MIDAZOLAM HYDROCHLORIDE 1 MG: 1 INJECTION, SOLUTION INTRAMUSCULAR; INTRAVENOUS at 09:05

## 2018-04-27 RX ADMIN — PROPOFOL 25 MG: 10 INJECTION, EMULSION INTRAVENOUS at 11:14

## 2018-04-27 RX ADMIN — PROPOFOL 150 MG: 10 INJECTION, EMULSION INTRAVENOUS at 11:13

## 2018-04-27 RX ADMIN — CARVEDILOL 3.12 MG: 3.12 TABLET, FILM COATED ORAL at 18:01

## 2018-04-27 RX ADMIN — HYDROMORPHONE HYDROCHLORIDE 2 MG: 2 TABLET ORAL at 20:44

## 2018-04-27 RX ADMIN — FENTANYL CITRATE 25 MCG: 50 INJECTION INTRAMUSCULAR; INTRAVENOUS at 11:40

## 2018-04-27 RX ADMIN — KETOROLAC TROMETHAMINE 15 MG: 30 INJECTION, SOLUTION INTRAMUSCULAR at 13:20

## 2018-04-27 RX ADMIN — EPHEDRINE SULFATE 5 MG: 50 INJECTION INTRAMUSCULAR; INTRAVENOUS; SUBCUTANEOUS at 11:30

## 2018-04-27 RX ADMIN — FENTANYL CITRATE 50 MCG: 50 INJECTION INTRAMUSCULAR; INTRAVENOUS at 13:21

## 2018-04-27 RX ADMIN — SODIUM CHLORIDE, POTASSIUM CHLORIDE, SODIUM LACTATE AND CALCIUM CHLORIDE 9 ML/HR: 600; 310; 30; 20 INJECTION, SOLUTION INTRAVENOUS at 08:14

## 2018-04-27 RX ADMIN — EPHEDRINE SULFATE 10 MG: 50 INJECTION INTRAMUSCULAR; INTRAVENOUS; SUBCUTANEOUS at 12:05

## 2018-04-27 RX ADMIN — PHENYLEPHRINE HYDROCHLORIDE 100 MCG: 10 INJECTION INTRAVENOUS at 11:20

## 2018-04-27 RX ADMIN — ONDANSETRON 4 MG: 2 INJECTION INTRAMUSCULAR; INTRAVENOUS at 21:01

## 2018-04-27 RX ADMIN — ACETAMINOPHEN 1000 MG: 10 INJECTION, SOLUTION INTRAVENOUS at 11:27

## 2018-04-27 RX ADMIN — EPHEDRINE SULFATE 5 MG: 50 INJECTION INTRAMUSCULAR; INTRAVENOUS; SUBCUTANEOUS at 11:38

## 2018-04-27 RX ADMIN — LIDOCAINE HYDROCHLORIDE 100 MG: 20 INJECTION, SOLUTION INFILTRATION; PERINEURAL at 11:13

## 2018-04-27 RX ADMIN — ROCURONIUM BROMIDE 50 MG: 10 INJECTION INTRAVENOUS at 11:13

## 2018-04-27 NOTE — ANESTHESIA PROCEDURE NOTES
Airway  Urgency: elective    Airway not difficult    General Information and Staff    Patient location during procedure: OR  Anesthesiologist: ANGE LITTLE    Indications and Patient Condition  Indications for airway management: airway protection    Preoxygenated: yes  Mask difficulty assessment: 1 - vent by mask    Final Airway Details  Final airway type: endotracheal airway      Successful airway: ETT  Cuffed: yes   Successful intubation technique: direct laryngoscopy  Facilitating devices/methods: cricoid pressure and intubating stylet  Endotracheal tube insertion site: oral  Blade: Nani  ETT size: 7.0 mm  Cormack-Lehane Classification: grade III - view of epiglottis only  Placement verified by: chest auscultation and capnometry   Inital cuff pressure (cm H2O): 21  Cuff volume (mL): 6  Measured from: teeth  ETT to teeth (cm): 21  Number of attempts at approach: 1    Additional Comments  Atruamtic.

## 2018-04-27 NOTE — ANESTHESIA POSTPROCEDURE EVALUATION
"Patient: Kyler Horne    Procedure Summary     Date:  04/27/18 Room / Location:  [facility] OR [address] [place] [address] MAIN OR    Anesthesia Start:  1107 Anesthesia Stop:  1251    Procedure:  TOTAL HIP ARTHROPLASTY (Right Hip) Diagnosis:       Primary osteoarthritis of right hip      (Primary osteoarthritis of right hip [M16.11])    Surgeon:  Del Dumont MD Provider:  Becky Almendarez MD    Anesthesia Type:  general ASA Status:  3          Anesthesia Type: general  Last vitals  BP   153/90 (04/27/18 1345)   Temp   36.7 °C (98 °F) (04/27/18 1345)   Pulse   58 (04/27/18 1249)   Resp   16 (04/27/18 1345)     SpO2   95 % (04/27/18 0912)     Post Anesthesia Care and Evaluation    Patient location during evaluation: bedside  Patient participation: complete - patient participated  Level of consciousness: awake and alert  Pain management: adequate  Airway patency: patent  Anesthetic complications: No anesthetic complications    Cardiovascular status: acceptable  Respiratory status: acceptable  Hydration status: acceptable    Comments: /90   Pulse 58   Temp 36.7 °C (98 °F) (Oral)   Resp 16   Ht 157.5 cm (62\")   Wt 73.1 kg (161 lb 1.6 oz)   SpO2 95%   BMI 29.47 kg/m²       "

## 2018-04-28 LAB
GLUCOSE BLDC GLUCOMTR-MCNC: 129 MG/DL (ref 70–130)
GLUCOSE BLDC GLUCOMTR-MCNC: 167 MG/DL (ref 70–130)
GLUCOSE BLDC GLUCOMTR-MCNC: 172 MG/DL (ref 70–130)
HCT VFR BLD AUTO: 47.2 % (ref 35.6–45.5)
HGB BLD-MCNC: 14.6 G/DL (ref 11.9–15.5)

## 2018-04-28 PROCEDURE — 25010000003 CEFAZOLIN IN DEXTROSE 2-4 GM/100ML-% SOLUTION: Performed by: NURSE PRACTITIONER

## 2018-04-28 PROCEDURE — 85014 HEMATOCRIT: CPT | Performed by: NURSE PRACTITIONER

## 2018-04-28 PROCEDURE — 25010000002 KETOROLAC TROMETHAMINE PER 15 MG: Performed by: NURSE PRACTITIONER

## 2018-04-28 PROCEDURE — 97110 THERAPEUTIC EXERCISES: CPT

## 2018-04-28 PROCEDURE — 85018 HEMOGLOBIN: CPT | Performed by: NURSE PRACTITIONER

## 2018-04-28 PROCEDURE — 82962 GLUCOSE BLOOD TEST: CPT

## 2018-04-28 PROCEDURE — 25010000002 ONDANSETRON PER 1 MG: Performed by: NURSE PRACTITIONER

## 2018-04-28 RX ORDER — ONDANSETRON 4 MG/1
4 TABLET, FILM COATED ORAL EVERY 6 HOURS PRN
Qty: 20 TABLET | Refills: 0 | Status: SHIPPED | OUTPATIENT
Start: 2018-04-28 | End: 2019-05-02

## 2018-04-28 RX ORDER — MELOXICAM 7.5 MG/1
7.5 TABLET ORAL DAILY
Qty: 30 TABLET | Refills: 0 | Status: SHIPPED | OUTPATIENT
Start: 2018-04-28 | End: 2018-05-28

## 2018-04-28 RX ORDER — HYDROMORPHONE HYDROCHLORIDE 2 MG/1
2 TABLET ORAL EVERY 4 HOURS PRN
Qty: 30 TABLET | Refills: 0 | Status: SHIPPED | OUTPATIENT
Start: 2018-04-28 | End: 2018-05-28

## 2018-04-28 RX ADMIN — AMLODIPINE BESYLATE 5 MG: 5 TABLET ORAL at 10:14

## 2018-04-28 RX ADMIN — CEFAZOLIN SODIUM 2 G: 2 INJECTION, SOLUTION INTRAVENOUS at 03:38

## 2018-04-28 RX ADMIN — KETOROLAC TROMETHAMINE 15 MG: 30 INJECTION, SOLUTION INTRAMUSCULAR at 14:22

## 2018-04-28 RX ADMIN — HYDROMORPHONE HYDROCHLORIDE 2 MG: 2 TABLET ORAL at 14:22

## 2018-04-28 RX ADMIN — PANTOPRAZOLE SODIUM 40 MG: 40 TABLET, DELAYED RELEASE ORAL at 05:08

## 2018-04-28 RX ADMIN — MELOXICAM 7.5 MG: 7.5 TABLET ORAL at 10:15

## 2018-04-28 RX ADMIN — LISINOPRIL: 20 TABLET ORAL at 10:15

## 2018-04-28 RX ADMIN — ONDANSETRON 4 MG: 4 TABLET, FILM COATED ORAL at 14:22

## 2018-04-28 RX ADMIN — RIVAROXABAN 10 MG: 10 TABLET, FILM COATED ORAL at 18:15

## 2018-04-28 RX ADMIN — HYDROMORPHONE HYDROCHLORIDE 2 MG: 2 TABLET ORAL at 03:38

## 2018-04-28 RX ADMIN — CARVEDILOL 3.12 MG: 3.12 TABLET, FILM COATED ORAL at 18:15

## 2018-04-28 RX ADMIN — ONDANSETRON 4 MG: 2 INJECTION INTRAMUSCULAR; INTRAVENOUS at 08:27

## 2018-04-28 RX ADMIN — HYDROMORPHONE HYDROCHLORIDE 2 MG: 2 TABLET ORAL at 18:59

## 2018-04-28 RX ADMIN — HYDROMORPHONE HYDROCHLORIDE 2 MG: 2 TABLET ORAL at 23:13

## 2018-04-28 RX ADMIN — MUPIROCIN: 20 OINTMENT TOPICAL at 10:14

## 2018-04-28 RX ADMIN — CARVEDILOL 3.12 MG: 3.12 TABLET, FILM COATED ORAL at 10:14

## 2018-04-28 RX ADMIN — LEVOTHYROXINE SODIUM 100 MCG: 100 TABLET ORAL at 05:07

## 2018-04-28 RX ADMIN — KETOROLAC TROMETHAMINE 15 MG: 30 INJECTION, SOLUTION INTRAMUSCULAR at 05:07

## 2018-04-29 ENCOUNTER — APPOINTMENT (OUTPATIENT)
Dept: CARDIOLOGY | Facility: HOSPITAL | Age: 78
End: 2018-04-29
Attending: ORTHOPAEDIC SURGERY

## 2018-04-29 VITALS
SYSTOLIC BLOOD PRESSURE: 135 MMHG | RESPIRATION RATE: 16 BRPM | BODY MASS INDEX: 29.64 KG/M2 | HEIGHT: 62 IN | DIASTOLIC BLOOD PRESSURE: 79 MMHG | HEART RATE: 93 BPM | TEMPERATURE: 97.6 F | OXYGEN SATURATION: 95 % | WEIGHT: 161.1 LBS

## 2018-04-29 LAB
BH CV LOWER VASCULAR LEFT COMMON FEMORAL AUGMENT: NORMAL
BH CV LOWER VASCULAR LEFT COMMON FEMORAL COMPETENT: NORMAL
BH CV LOWER VASCULAR LEFT COMMON FEMORAL COMPRESS: NORMAL
BH CV LOWER VASCULAR LEFT COMMON FEMORAL PHASIC: NORMAL
BH CV LOWER VASCULAR LEFT COMMON FEMORAL SPONT: NORMAL
BH CV LOWER VASCULAR RIGHT COMMON FEMORAL AUGMENT: NORMAL
BH CV LOWER VASCULAR RIGHT COMMON FEMORAL COMPETENT: NORMAL
BH CV LOWER VASCULAR RIGHT COMMON FEMORAL COMPRESS: NORMAL
BH CV LOWER VASCULAR RIGHT COMMON FEMORAL PHASIC: NORMAL
BH CV LOWER VASCULAR RIGHT COMMON FEMORAL SPONT: NORMAL
BH CV LOWER VASCULAR RIGHT DISTAL FEMORAL COMPRESS: NORMAL
BH CV LOWER VASCULAR RIGHT GASTRONEMIUS COMPRESS: NORMAL
BH CV LOWER VASCULAR RIGHT GREATER SAPH AK COMPRESS: NORMAL
BH CV LOWER VASCULAR RIGHT GREATER SAPH BK COMPRESS: NORMAL
BH CV LOWER VASCULAR RIGHT LESSER SAPH COMPRESS: NORMAL
BH CV LOWER VASCULAR RIGHT MID FEMORAL AUGMENT: NORMAL
BH CV LOWER VASCULAR RIGHT MID FEMORAL COMPETENT: NORMAL
BH CV LOWER VASCULAR RIGHT MID FEMORAL COMPRESS: NORMAL
BH CV LOWER VASCULAR RIGHT MID FEMORAL PHASIC: NORMAL
BH CV LOWER VASCULAR RIGHT MID FEMORAL SPONT: NORMAL
BH CV LOWER VASCULAR RIGHT PERONEAL COMPRESS: NORMAL
BH CV LOWER VASCULAR RIGHT POPLITEAL AUGMENT: NORMAL
BH CV LOWER VASCULAR RIGHT POPLITEAL COMPETENT: NORMAL
BH CV LOWER VASCULAR RIGHT POPLITEAL COMPRESS: NORMAL
BH CV LOWER VASCULAR RIGHT POPLITEAL PHASIC: NORMAL
BH CV LOWER VASCULAR RIGHT POPLITEAL SPONT: NORMAL
BH CV LOWER VASCULAR RIGHT POSTERIOR TIBIAL COMPRESS: NORMAL
BH CV LOWER VASCULAR RIGHT PROXIMAL FEMORAL COMPRESS: NORMAL
BH CV LOWER VASCULAR RIGHT SAPHENOFEMORAL JUNCTION AUGMENT: NORMAL
BH CV LOWER VASCULAR RIGHT SAPHENOFEMORAL JUNCTION COMPETENT: NORMAL
BH CV LOWER VASCULAR RIGHT SAPHENOFEMORAL JUNCTION COMPRESS: NORMAL
BH CV LOWER VASCULAR RIGHT SAPHENOFEMORAL JUNCTION PHASIC: NORMAL
BH CV LOWER VASCULAR RIGHT SAPHENOFEMORAL JUNCTION SPONT: NORMAL
HCT VFR BLD AUTO: 41.7 % (ref 35.6–45.5)
HGB BLD-MCNC: 12.9 G/DL (ref 11.9–15.5)

## 2018-04-29 PROCEDURE — 82962 GLUCOSE BLOOD TEST: CPT

## 2018-04-29 PROCEDURE — 97110 THERAPEUTIC EXERCISES: CPT

## 2018-04-29 PROCEDURE — 85018 HEMOGLOBIN: CPT | Performed by: NURSE PRACTITIONER

## 2018-04-29 PROCEDURE — 97150 GROUP THERAPEUTIC PROCEDURES: CPT

## 2018-04-29 PROCEDURE — 85014 HEMATOCRIT: CPT | Performed by: NURSE PRACTITIONER

## 2018-04-29 PROCEDURE — 93971 EXTREMITY STUDY: CPT

## 2018-04-29 RX ADMIN — CARVEDILOL 3.12 MG: 3.12 TABLET, FILM COATED ORAL at 07:40

## 2018-04-29 RX ADMIN — MUPIROCIN: 20 OINTMENT TOPICAL at 07:41

## 2018-04-29 RX ADMIN — PANTOPRAZOLE SODIUM 40 MG: 40 TABLET, DELAYED RELEASE ORAL at 03:25

## 2018-04-29 RX ADMIN — MELOXICAM 7.5 MG: 7.5 TABLET ORAL at 07:40

## 2018-04-29 RX ADMIN — AMLODIPINE BESYLATE 5 MG: 5 TABLET ORAL at 07:40

## 2018-04-29 RX ADMIN — HYDROMORPHONE HYDROCHLORIDE 2 MG: 2 TABLET ORAL at 03:26

## 2018-04-29 RX ADMIN — POLYETHYLENE GLYCOL 3350 17 G: 17 POWDER, FOR SOLUTION ORAL at 07:41

## 2018-04-29 RX ADMIN — LISINOPRIL: 20 TABLET ORAL at 07:40

## 2018-04-29 RX ADMIN — LEVOTHYROXINE SODIUM 100 MCG: 100 TABLET ORAL at 03:25

## 2018-04-29 RX ADMIN — HYDROMORPHONE HYDROCHLORIDE 2 MG: 2 TABLET ORAL at 11:40

## 2018-04-29 RX ADMIN — HYDROMORPHONE HYDROCHLORIDE 2 MG: 2 TABLET ORAL at 07:38

## 2018-04-29 RX ADMIN — DOCUSATE SODIUM 100 MG: 100 CAPSULE, LIQUID FILLED ORAL at 07:41

## 2018-04-30 ENCOUNTER — TELEPHONE (OUTPATIENT)
Dept: ORTHOPEDIC SURGERY | Facility: CLINIC | Age: 78
End: 2018-04-30

## 2018-04-30 LAB
GLUCOSE BLDC GLUCOMTR-MCNC: 147 MG/DL (ref 70–130)
GLUCOSE BLDC GLUCOMTR-MCNC: 160 MG/DL (ref 70–130)
GLUCOSE BLDC GLUCOMTR-MCNC: 203 MG/DL (ref 70–130)

## 2018-04-30 NOTE — TELEPHONE ENCOUNTER
Spoke with patient's daughter, Ashlee.  At JUAN MANUEL's advice, patient is to cut dilauded in half.  If she has any more issues, she will call back tomorrow.

## 2018-04-30 NOTE — TELEPHONE ENCOUNTER
Call and check on her please.  I suspect that she is having issues with constipation.  Find out if she has had a bowel movement since surgery.  She will probably need to get some prune juice and/or MiraLAX to work on that.

## 2018-05-11 ENCOUNTER — OFFICE VISIT (OUTPATIENT)
Dept: ORTHOPEDIC SURGERY | Facility: CLINIC | Age: 78
End: 2018-05-11

## 2018-05-11 VITALS — HEIGHT: 61 IN | WEIGHT: 167 LBS | BODY MASS INDEX: 31.53 KG/M2 | TEMPERATURE: 97.3 F

## 2018-05-11 DIAGNOSIS — Z96.641 STATUS POST RIGHT HIP REPLACEMENT: Primary | ICD-10-CM

## 2018-05-11 PROCEDURE — 99024 POSTOP FOLLOW-UP VISIT: CPT | Performed by: ORTHOPAEDIC SURGERY

## 2018-05-11 NOTE — PROGRESS NOTES
Kyler Horne : 1940 MRN: [id number] DATE: 2018    DIAGNOSIS: 2 week follow up right total hip     SUBJECTIVE:Patient returns today for 2 week follow up of right total hip replacement. Patient reports doing well with no unusual complaints. Appears to be progressing appropriately.    OBJECTIVE:   Exam:. The incision is healing appropriately. No sign of infection. Range of motion is progressing as expected. The calf is soft and nontender with a negative Homans sign..    ASSESSMENT: 2 week status post right hip replacement.    PLAN: 1) Staples removed and steri strips applied   2) PT exercises   3) Discontinue HATTIE hose   4) Continue ice PRN   5) WBAT   6) warfarin for lifetime   7) Follow up in 6 weeks with repeat Xrays of right hip (2views)    Del Dumont MD  2018

## 2018-06-22 ENCOUNTER — OFFICE VISIT (OUTPATIENT)
Dept: ORTHOPEDIC SURGERY | Facility: CLINIC | Age: 78
End: 2018-06-22

## 2018-06-22 VITALS — HEIGHT: 62 IN | WEIGHT: 166 LBS | TEMPERATURE: 97.4 F | BODY MASS INDEX: 30.55 KG/M2

## 2018-06-22 DIAGNOSIS — Z96.641 STATUS POST TOTAL REPLACEMENT OF RIGHT HIP: Primary | ICD-10-CM

## 2018-06-22 PROCEDURE — 99024 POSTOP FOLLOW-UP VISIT: CPT | Performed by: ORTHOPAEDIC SURGERY

## 2018-06-22 NOTE — PROGRESS NOTES
Kyler Horne : 1940 MRN: [id number] DATE: 2018    DIAGNOSIS: 8 week follow up right total hip     SUBJECTIVE:Patient returns today for 8 week follow up of right total hip replacement. Patient reports doing well with no unusual complaints. Appears to be progressing appropriately and is off a cane    OBJECTIVE:   Exam:. The incision is healed. No sign of infection. Range of motion is progressing as expected. The calf is soft and nontender with a negative Homans sign. Strength progressing    DIAGNOSTIC STUDIES  Xrays: 2 views of the right hip (AP pelvis and lateral right hip) were ordered and reviewed for evaluation of recent hip replacement. They demonstrate a well positioned, well aligned hip replacement without complicating factors noted. In comparison with previous films there has been interval implant placement.    ASSESSMENT: 8 week status post right hip replacement.    PLAN: 1) Activity as tolerated   2) Continue hip strengthening exercises    3) Follow up 1 year post-op with repeat Xrays of right hip (2views AP Pelvis      and lateral left hip)    Del Dumont MD  2018

## 2019-05-02 ENCOUNTER — OFFICE VISIT (OUTPATIENT)
Dept: ORTHOPEDIC SURGERY | Facility: CLINIC | Age: 79
End: 2019-05-02

## 2019-05-02 VITALS — TEMPERATURE: 97.5 F | BODY MASS INDEX: 29.92 KG/M2 | HEIGHT: 62 IN | WEIGHT: 162.6 LBS

## 2019-05-02 DIAGNOSIS — M25.551 PAIN OF RIGHT HIP JOINT: ICD-10-CM

## 2019-05-02 DIAGNOSIS — Z96.641 STATUS POST TOTAL REPLACEMENT OF RIGHT HIP: Primary | ICD-10-CM

## 2019-05-02 PROCEDURE — 99213 OFFICE O/P EST LOW 20 MIN: CPT | Performed by: ORTHOPAEDIC SURGERY

## 2019-05-02 PROCEDURE — 73502 X-RAY EXAM HIP UNI 2-3 VIEWS: CPT | Performed by: ORTHOPAEDIC SURGERY

## 2019-05-02 NOTE — PROGRESS NOTES
Patient: Kyler Horne  YOB: 1940 79 y.o. female  Medical Record Number: [id number]    Chief Complaints:   Chief Complaint   Patient presents with   • Right Hip - Follow-up, Pain       History of Present Illness:Kyler Horne is a 79 y.o. female who presents with right posterior hip pain moderate to severe hurts when she stands and walks described as a stabbing aching pain for the past 4 months.  She is a year out from a right total hip replacement and overall that is done well.  She denies any groin or anterior thigh pain.    Allergies:   Allergies   Allergen Reactions   • Ciprofloxacin Diarrhea   • Clavulanic Acid Itching   • Hydrocodone GI Intolerance       Medications:   Current Outpatient Medications   Medication Sig Dispense Refill   • Acetaminophen (TYLENOL EXTRA STRENGTH PO) Take 1,000 mg by mouth As Needed.     • amLODIPine (NORVASC) 5 MG tablet Take 5 mg by mouth Every Morning.     • carvedilol (COREG) 3.125 MG tablet Take 3.125 mg by mouth 2 (two) times a day with meals.     • colesevelam (WELCHOL) 625 MG tablet Take 625 mg by mouth 4 (Four) Times a Day.     • Cyanocobalamin (VITAMIN B 12 PO) Take 3,000 mcg by mouth Daily. STOP DATE 4/19/2018 FOR SURGERY     • FIBER SELECT GUMMIES PO Take 5 g by mouth Daily.     • insulin glargine (LANTUS) 100 UNIT/ML injection Inject 19 Units under the skin At Night As Needed. SLIDING SCALE     • levothyroxine (SYNTHROID, LEVOTHROID) 100 MCG tablet Take 100 mcg by mouth daily.     • lisinopril-hydrochlorothiazide (PRINZIDE,ZESTORETIC) 20-12.5 MG per tablet Take 0.5 tablets by mouth Daily.     • Multiple Vitamins-Minerals (ADULT ONE DAILY GUMMIES PO) Take 2 tablets by mouth Daily. STOP DATE 4/19/2018 FOR SURGERY     • pantoprazole (PROTONIX) 40 MG EC tablet Take 40 mg by mouth Every Morning.     • pravastatin (PRAVACHOL) 40 MG tablet Take 40 mg by mouth Every Night.     • Probiotic Product (PROBIOTIC DAILY PO) Take 1 tablet by mouth Daily.     • Psyllium  "(FIBER) 0.52 g capsule Take 5 g by mouth Daily.     • vitamin d (CHOLECALIFEROL) 5000 units capsule Chew 5,000 Units Daily. STOP DATE 4/19/2018 FOR SURGERY       No current facility-administered medications for this visit.          The following portions of the patient's history were reviewed and updated as appropriate: allergies, current medications, past family history, past medical history, past social history, past surgical history and problem list.    Review of Systems:   A 14 point review of systems was performed. All systems negative except pertinent positives/negative listed in HPI above    Physical Exam:   Vitals:    05/02/19 1549   Temp: 97.5 °F (36.4 °C)   TempSrc: Temporal   Weight: 73.8 kg (162 lb 9.6 oz)   Height: 157.5 cm (62\")       General: A and O x 3, ASA, NAD    SCLERA:    Normal    DENTITION:   Normal     There is tenderness to palpation about the right sacroiliac joint hip motion is free and full leg lengths are equal skin is normal.    Radiology:  Xrays 2views right hip (AP bilateral hips, and lateral of the hip) ordered and reviewed for evaluation of hip pain  demonstrating well-positioned total hip replacement no issues noted in comparison with previous films are unchanged.  She does have some degenerative change of the right sacroiliac joint and also fairly significant degenerative change of the lower lumbar spine.    Assessment/Plan: Right posterior hip pain most likely consistent with sacroiliitis.  I am going to set him up with a consultation for an ultrasound-guided sacroiliac joint injection.  I told her that she should give it roughly 3 weeks after the injection and if still not better I will consider an MRI of the lumbar spine because she does have extensive degenerative change with could cause right sacroiliitis and posterior hip and buttock pain      Del Dumont MD  5/2/2019  "

## 2019-05-07 ENCOUNTER — OFFICE VISIT (OUTPATIENT)
Dept: SPORTS MEDICINE | Facility: CLINIC | Age: 79
End: 2019-05-07

## 2019-05-07 VITALS
DIASTOLIC BLOOD PRESSURE: 70 MMHG | OXYGEN SATURATION: 95 % | HEART RATE: 59 BPM | HEIGHT: 62 IN | WEIGHT: 162.7 LBS | SYSTOLIC BLOOD PRESSURE: 102 MMHG | BODY MASS INDEX: 29.94 KG/M2

## 2019-05-07 DIAGNOSIS — M46.1 SACROILIITIS (HCC): Primary | ICD-10-CM

## 2019-05-07 PROCEDURE — 99999 PR OFFICE/OUTPT VISIT,PROCEDURE ONLY: CPT | Performed by: FAMILY MEDICINE

## 2019-05-07 PROCEDURE — 20611 DRAIN/INJ JOINT/BURSA W/US: CPT | Performed by: FAMILY MEDICINE

## 2019-05-07 RX ORDER — TRIAMCINOLONE ACETONIDE 40 MG/ML
80 INJECTION, SUSPENSION INTRA-ARTICULAR; INTRAMUSCULAR ONCE
Status: COMPLETED | OUTPATIENT
Start: 2019-05-07 | End: 2019-05-07

## 2019-05-07 RX ADMIN — TRIAMCINOLONE ACETONIDE 80 MG: 40 INJECTION, SUSPENSION INTRA-ARTICULAR; INTRAMUSCULAR at 16:09

## 2019-05-07 NOTE — PROGRESS NOTES
"Ultrasound-Guided Sacroiliac Joint Injection Procedure Note    Right sacroiliac joint injection was discussed with the patient in detail, including indication, risks, benefits, and alternatives. Verbal consent was given for the procedure. Injection was performed by physician.  Injection site was identified by ultrasound examination, then cleaned with Betadine and alcohol swabs. Prior to needle insertion, ethyl chloride spray was used for surface anesthesia. Sterile technique was used. Ultrasound guidance was indicated for injection accuracy.  A 22-gauge, 3.5\" spinal needle was guided to the joint under continuous direct ultrasound visualization. Injectate was seen infiltrating beneath the superficial ligaments and passed without difficulty. The needle was removed and a simple bandage was applied. The procedure was tolerated well without difficulty.    Injection mixture:  1% lidocaine without epinephrine: 4mL  40 mg/mL triamcinolone acetonide: 2 mL      ICD-10-CM ICD-9-CM   1. Sacroiliitis (CMS/[facility]) M46.1 720.2       "

## 2019-07-22 ENCOUNTER — OFFICE VISIT (OUTPATIENT)
Dept: ORTHOPEDIC SURGERY | Facility: CLINIC | Age: 79
End: 2019-07-22

## 2019-07-22 VITALS — WEIGHT: 163 LBS | HEIGHT: 62 IN | BODY MASS INDEX: 30 KG/M2

## 2019-07-22 DIAGNOSIS — M25.552 HIP PAIN, ACUTE, LEFT: Primary | ICD-10-CM

## 2019-07-22 PROCEDURE — 73502 X-RAY EXAM HIP UNI 2-3 VIEWS: CPT | Performed by: NURSE PRACTITIONER

## 2019-07-22 PROCEDURE — 99214 OFFICE O/P EST MOD 30 MIN: CPT | Performed by: NURSE PRACTITIONER

## 2019-07-22 RX ORDER — LANCETS
EACH MISCELLANEOUS
COMMUNITY
Start: 2019-07-01

## 2019-07-22 NOTE — PROGRESS NOTES
Patient: Kyler Horne  YOB: 1940 79 y.o. female  Medical Record Number: [id number]    Chief Complaints:   Chief Complaint   Patient presents with   • Left Hip - Follow-up, Pain, Establish Care       History of Present Illness:Kyler Horne is a 79 y.o. female who presents with new complaint of left hip pain.  Patient reports it started becoming painful about 3 months ago and has progressively gotten worse.  She describes the hip pain as a moderate sometimes severe constant ache worse with walking only slightly better with rest.    Allergies:   Allergies   Allergen Reactions   • Ciprofloxacin Diarrhea   • Clavulanic Acid Itching   • Hydrocodone GI Intolerance       Medications:   Current Outpatient Medications   Medication Sig Dispense Refill   • Acetaminophen (TYLENOL EXTRA STRENGTH PO) Take 1,000 mg by mouth As Needed.     • amLODIPine (NORVASC) 5 MG tablet Take 5 mg by mouth Every Morning.     • carvedilol (COREG) 3.125 MG tablet Take 3.125 mg by mouth 2 (two) times a day with meals.     • colesevelam (WELCHOL) 625 MG tablet Take 625 mg by mouth 4 (Four) Times a Day.     • Cyanocobalamin (VITAMIN B 12 PO) Take 3,000 mcg by mouth Daily. STOP DATE 4/19/2018 FOR SURGERY     • FIBER SELECT GUMMIES PO Take 5 g by mouth Daily.     • insulin glargine (LANTUS) 100 UNIT/ML injection Inject 19 Units under the skin At Night As Needed. SLIDING SCALE     • KROGER LANCETS THIN 26G misc USE THREE TIMES A DAY TO TEST BLOOD SUGAR     • levothyroxine (SYNTHROID, LEVOTHROID) 100 MCG tablet Take 100 mcg by mouth daily.     • lisinopril-hydrochlorothiazide (PRINZIDE,ZESTORETIC) 20-12.5 MG per tablet Take 0.5 tablets by mouth Daily.     • Multiple Vitamins-Minerals (ADULT ONE DAILY GUMMIES PO) Take 2 tablets by mouth Daily. STOP DATE 4/19/2018 FOR SURGERY     • pantoprazole (PROTONIX) 40 MG EC tablet Take 40 mg by mouth Every Morning.     • pravastatin (PRAVACHOL) 40 MG tablet Take 40 mg by mouth Every Night.     •  "Probiotic Product (PROBIOTIC DAILY PO) Take 1 tablet by mouth Daily.     • Psyllium (FIBER) 0.52 g capsule Take 5 g by mouth Daily.     • vitamin d (CHOLECALIFEROL) 5000 units capsule Chew 5,000 Units Daily. STOP DATE 4/19/2018 FOR SURGERY       No current facility-administered medications for this visit.          The following portions of the patient's history were reviewed and updated as appropriate: allergies, current medications, past family history, past medical history, past social history, past surgical history and problem list.    Review of Systems:   A 14 point review of systems was performed. All systems negative except pertinent positives/negative listed in HPI above    Physical Exam:   Vitals:    07/22/19 0857   Weight: 73.9 kg (163 lb)   Height: 157.5 cm (62\")       General: A and O x 3, ASA, NAD    SCLERA:    Normal    DENTITION:   Normal  Skin clear no unusual lesions noted  Left hip patient is nontender palpation she has pain with internal/external rotation with a positive central positive logroll calf soft nontender    Radiology:  Xrays 2 views of left hip ordered and reviewed today secondary to pain and show bone-on-bone end-stage osteoarthritis.  Compared to views show definite progression in arthritis    Assessment/Plan:  End-stage osteoarthritis left hip    We will proceed with a left hip fluoroscopy guided cortisone injection to see if that helps relieve any of her pain and she will follow-up with Dr. Dumont in about 3 to 4 weeks to most likely discuss total hip replacement  "

## 2019-07-30 ENCOUNTER — HOSPITAL ENCOUNTER (OUTPATIENT)
Dept: GENERAL RADIOLOGY | Facility: HOSPITAL | Age: 79
Discharge: HOME | End: 2019-07-30
Admitting: NURSE PRACTITIONER

## 2019-07-30 DIAGNOSIS — M25.552 HIP PAIN, ACUTE, LEFT: ICD-10-CM

## 2019-07-30 PROCEDURE — 25010000003 LIDOCAINE 1 % SOLUTION: Performed by: RADIOLOGY

## 2019-07-30 PROCEDURE — 25010000002 METHYLPREDNISOLONE PER 125 MG: Performed by: RADIOLOGY

## 2019-07-30 PROCEDURE — 77002 NEEDLE LOCALIZATION BY XRAY: CPT

## 2019-07-30 PROCEDURE — 25010000002 IOPAMIDOL 61 % SOLUTION: Performed by: RADIOLOGY

## 2019-07-30 RX ORDER — BUPIVACAINE HYDROCHLORIDE 2.5 MG/ML
10 INJECTION, SOLUTION EPIDURAL; INFILTRATION; INTRACAUDAL ONCE
Status: COMPLETED | OUTPATIENT
Start: 2019-07-30 | End: 2019-07-30

## 2019-07-30 RX ORDER — LIDOCAINE HYDROCHLORIDE 10 MG/ML
10 INJECTION, SOLUTION INFILTRATION; PERINEURAL ONCE
Status: COMPLETED | OUTPATIENT
Start: 2019-07-30 | End: 2019-07-30

## 2019-07-30 RX ORDER — METHYLPREDNISOLONE SODIUM SUCCINATE 125 MG/2ML
80 INJECTION, POWDER, LYOPHILIZED, FOR SOLUTION INTRAMUSCULAR; INTRAVENOUS
Status: COMPLETED | OUTPATIENT
Start: 2019-07-30 | End: 2019-07-30

## 2019-07-30 RX ADMIN — BUPIVACAINE HYDROCHLORIDE 5 ML: 2.5 INJECTION, SOLUTION EPIDURAL; INFILTRATION; INTRACAUDAL; PERINEURAL at 10:20

## 2019-07-30 RX ADMIN — IOPAMIDOL 1 ML: 612 INJECTION, SOLUTION INTRAVENOUS at 10:20

## 2019-07-30 RX ADMIN — METHYLPREDNISOLONE SODIUM SUCCINATE 80 MG: 125 INJECTION, POWDER, LYOPHILIZED, FOR SOLUTION INTRAMUSCULAR; INTRAVENOUS at 10:20

## 2019-07-30 RX ADMIN — LIDOCAINE HYDROCHLORIDE 3 ML: 10 INJECTION, SOLUTION INFILTRATION; PERINEURAL at 10:20

## 2019-08-22 ENCOUNTER — OFFICE VISIT (OUTPATIENT)
Dept: ORTHOPEDIC SURGERY | Facility: CLINIC | Age: 79
End: 2019-08-22

## 2019-08-22 VITALS — HEIGHT: 62 IN | BODY MASS INDEX: 30 KG/M2 | WEIGHT: 163 LBS | TEMPERATURE: 97.3 F

## 2019-08-22 DIAGNOSIS — M25.562 ACUTE PAIN OF LEFT KNEE: Primary | ICD-10-CM

## 2019-08-22 PROCEDURE — 73562 X-RAY EXAM OF KNEE 3: CPT | Performed by: ORTHOPAEDIC SURGERY

## 2019-08-22 PROCEDURE — 20610 DRAIN/INJ JOINT/BURSA W/O US: CPT | Performed by: ORTHOPAEDIC SURGERY

## 2019-08-22 PROCEDURE — 99213 OFFICE O/P EST LOW 20 MIN: CPT | Performed by: ORTHOPAEDIC SURGERY

## 2019-08-22 RX ORDER — METHYLPREDNISOLONE ACETATE 80 MG/ML
80 INJECTION, SUSPENSION INTRA-ARTICULAR; INTRALESIONAL; INTRAMUSCULAR; SOFT TISSUE
Status: COMPLETED | OUTPATIENT
Start: 2019-08-22 | End: 2019-08-22

## 2019-08-22 RX ADMIN — METHYLPREDNISOLONE ACETATE 80 MG: 80 INJECTION, SUSPENSION INTRA-ARTICULAR; INTRALESIONAL; INTRAMUSCULAR; SOFT TISSUE at 14:32

## 2019-08-22 NOTE — PROGRESS NOTES
Patient: Kyler Horne  YOB: 1940 79 y.o. female  Medical Record Number: [id number]    Chief Complaints:   Chief Complaint   Patient presents with   • Left Hip - Follow-up   • Left Knee - Pain       History of Present Illness:Kyler Horne is a 79 y.o. female who presents with c/o left knn pain  -lateral ache and pulling.  Had an injection into the right sacroiliac joint that is helped quite a bit.  Recently she twisted her knee and felt a pull within the left knee and has had soreness since then.  She has had no recent treatment for knee issues.    Allergies:   Allergies   Allergen Reactions   • Ciprofloxacin Diarrhea   • Clavulanic Acid Itching   • Hydrocodone GI Intolerance       Medications:   Current Outpatient Medications   Medication Sig Dispense Refill   • Acetaminophen (TYLENOL EXTRA STRENGTH PO) Take 1,000 mg by mouth As Needed.     • amLODIPine (NORVASC) 5 MG tablet Take 5 mg by mouth Every Morning.     • carvedilol (COREG) 3.125 MG tablet Take 3.125 mg by mouth 2 (two) times a day with meals.     • colesevelam (WELCHOL) 625 MG tablet Take 625 mg by mouth 4 (Four) Times a Day.     • Cyanocobalamin (VITAMIN B 12 PO) Take 3,000 mcg by mouth Daily. STOP DATE 4/19/2018 FOR SURGERY     • FIBER SELECT GUMMIES PO Take 5 g by mouth Daily.     • insulin glargine (LANTUS) 100 UNIT/ML injection Inject 19 Units under the skin At Night As Needed. SLIDING SCALE     • KROGER LANCETS THIN 26G misc USE THREE TIMES A DAY TO TEST BLOOD SUGAR     • levothyroxine (SYNTHROID, LEVOTHROID) 100 MCG tablet Take 100 mcg by mouth daily.     • lisinopril-hydrochlorothiazide (PRINZIDE,ZESTORETIC) 20-12.5 MG per tablet Take 0.5 tablets by mouth Daily.     • Multiple Vitamins-Minerals (ADULT ONE DAILY GUMMIES PO) Take 2 tablets by mouth Daily. STOP DATE 4/19/2018 FOR SURGERY     • pantoprazole (PROTONIX) 40 MG EC tablet Take 40 mg by mouth Every Morning.     • pravastatin (PRAVACHOL) 40 MG tablet Take 40 mg by mouth Every  "Night.     • Probiotic Product (PROBIOTIC DAILY PO) Take 1 tablet by mouth Daily.     • Psyllium (FIBER) 0.52 g capsule Take 5 g by mouth Daily.     • vitamin d (CHOLECALIFEROL) 5000 units capsule Chew 5,000 Units Daily. STOP DATE 4/19/2018 FOR SURGERY       No current facility-administered medications for this visit.          The following portions of the patient's history were reviewed and updated as appropriate: allergies, current medications, past family history, past medical history, past social history, past surgical history and problem list.    Review of Systems:   A 14 point review of systems was performed. All systems negative except pertinent positives/negative listed in HPI above    Physical Exam:   Vitals:    08/22/19 1413   Temp: 97.3 °F (36.3 °C)   TempSrc: Temporal   Weight: 73.9 kg (163 lb)   Height: 157.5 cm (62\")       General: A and O x 3, ASA, NAD    SCLERA:    Normal    DENTITION:   Normal   Knee:  left    ALIGNMENT:     Valgus      GAIT:    Antalgic    SKIN:    No abnormality    RANGE OF MOTION:   2  -  120   DEG    STRENGTH:   4  / 5    LIGAMENTS:    No varus / valgus instability.   Negative  Lachman.    MENISCUS:     Negative   Shahab       DISTAL PULSES:    Paplable    DISTAL SENSATION :   Intact    LYMPHATICS:     No   lymphadenopathy    OTHER:          - Positive   effusion      - Crepitance with ROM       Radiology:  Xrays 3views LEFT KNEE (ap,lateral, sunrise) were ordered and reviewed for evaluation of knee pain demonstrating moderately advanced valgus osteoarthritis with bone on bone articulation, subchondral cysts, and periarticular osteophytes. There are no previous films for comparision.    Assessment/Plan: Left knee moderate to moderately severe valgus osteoarthritis.  Injected as below.  Right sacroiliitis is improved following injection.  I will see her back as needed.  Large Joint Arthrocentesis: L knee  Date/Time: 8/22/2019 2:32 PM  Consent given by: patient  Site marked: " site marked  Timeout: Immediately prior to procedure a time out was called to verify the correct patient, procedure, equipment, support staff and site/side marked as required   Supporting Documentation  Indications: pain and joint swelling   Procedure Details  Location: knee - L knee  Preparation: Patient was prepped and draped in the usual sterile fashion  Needle gauge: 21 g.  Approach: anterolateral  Medications administered: 80 mg methylPREDNISolone acetate 80 MG/ML; 2 mL lidocaine (cardiac)  Patient tolerance: patient tolerated the procedure well with no immediate complications            Del Dumont MD  8/22/2019

## 2019-09-25 ENCOUNTER — TELEPHONE (OUTPATIENT)
Dept: ORTHOPEDIC SURGERY | Facility: CLINIC | Age: 79
End: 2019-09-25

## 2019-09-27 DIAGNOSIS — M16.12 PRIMARY OSTEOARTHRITIS OF LEFT HIP: Primary | ICD-10-CM

## 2019-09-27 RX ORDER — MELOXICAM 15 MG/1
15 TABLET ORAL ONCE
Status: CANCELLED | OUTPATIENT
Start: 2019-10-30 | End: 2019-09-27

## 2019-09-27 RX ORDER — PREGABALIN 75 MG/1
150 CAPSULE ORAL ONCE
Status: CANCELLED | OUTPATIENT
Start: 2019-10-30 | End: 2019-09-27

## 2019-09-27 RX ORDER — CEFAZOLIN SODIUM 2 G/100ML
2 INJECTION, SOLUTION INTRAVENOUS ONCE
Status: CANCELLED | OUTPATIENT
Start: 2019-10-30 | End: 2019-09-27

## 2019-09-27 RX ORDER — VANCOMYCIN HYDROCHLORIDE 1 G/200ML
15 INJECTION, SOLUTION INTRAVENOUS ONCE
Status: CANCELLED | OUTPATIENT
Start: 2019-10-30 | End: 2019-09-27

## 2019-10-07 PROBLEM — M16.12 PRIMARY OSTEOARTHRITIS OF LEFT HIP: Status: ACTIVE | Noted: 2019-10-07

## 2019-10-23 ENCOUNTER — APPOINTMENT (OUTPATIENT)
Dept: PREADMISSION TESTING | Facility: HOSPITAL | Age: 79
End: 2019-10-23

## 2019-10-23 VITALS
HEART RATE: 105 BPM | HEIGHT: 62 IN | BODY MASS INDEX: 29.92 KG/M2 | TEMPERATURE: 98.5 F | RESPIRATION RATE: 16 BRPM | SYSTOLIC BLOOD PRESSURE: 142 MMHG | OXYGEN SATURATION: 95 % | WEIGHT: 162.6 LBS | DIASTOLIC BLOOD PRESSURE: 78 MMHG

## 2019-10-23 DIAGNOSIS — M16.12 PRIMARY OSTEOARTHRITIS OF LEFT HIP: ICD-10-CM

## 2019-10-23 LAB
ANION GAP SERPL CALCULATED.3IONS-SCNC: 12.2 MMOL/L (ref 5–15)
BACTERIA UR QL AUTO: ABNORMAL /HPF
BILIRUB UR QL STRIP: NEGATIVE
BUN BLD-MCNC: 18 MG/DL (ref 8–23)
BUN/CREAT SERPL: 15.5 (ref 7–25)
CALCIUM SPEC-SCNC: 9.2 MG/DL (ref 8.6–10.5)
CHLORIDE SERPL-SCNC: 106 MMOL/L (ref 98–107)
CLARITY UR: CLEAR
CO2 SERPL-SCNC: 21.8 MMOL/L (ref 22–29)
COLOR UR: YELLOW
CREAT BLD-MCNC: 1.16 MG/DL (ref 0.57–1)
DEPRECATED RDW RBC AUTO: 50.4 FL (ref 37–54)
ERYTHROCYTE [DISTWIDTH] IN BLOOD BY AUTOMATED COUNT: 13.9 % (ref 12.3–15.4)
GFR SERPL CREATININE-BSD FRML MDRD: 45 ML/MIN/1.73
GLUCOSE BLD-MCNC: 187 MG/DL (ref 65–99)
GLUCOSE UR STRIP-MCNC: NEGATIVE MG/DL
HCT VFR BLD AUTO: 51.4 % (ref 34–46.6)
HGB BLD-MCNC: 16.4 G/DL (ref 12–15.9)
HGB UR QL STRIP.AUTO: NEGATIVE
HYALINE CASTS UR QL AUTO: ABNORMAL /LPF
KETONES UR QL STRIP: NEGATIVE
LEUKOCYTE ESTERASE UR QL STRIP.AUTO: ABNORMAL
MCH RBC QN AUTO: 31.5 PG (ref 26.6–33)
MCHC RBC AUTO-ENTMCNC: 31.9 G/DL (ref 31.5–35.7)
MCV RBC AUTO: 98.7 FL (ref 79–97)
NITRITE UR QL STRIP: NEGATIVE
PH UR STRIP.AUTO: <=5 [PH] (ref 5–8)
PLATELET # BLD AUTO: 494 10*3/MM3 (ref 140–450)
PMV BLD AUTO: 9.8 FL (ref 6–12)
POTASSIUM BLD-SCNC: 3.8 MMOL/L (ref 3.5–5.2)
PROT UR QL STRIP: NEGATIVE
RBC # BLD AUTO: 5.21 10*6/MM3 (ref 3.77–5.28)
RBC # UR: ABNORMAL /HPF
REF LAB TEST METHOD: ABNORMAL
SODIUM BLD-SCNC: 140 MMOL/L (ref 136–145)
SP GR UR STRIP: 1.02 (ref 1–1.03)
SQUAMOUS #/AREA URNS HPF: ABNORMAL /HPF
UROBILINOGEN UR QL STRIP: ABNORMAL
WBC NRBC COR # BLD: 12.86 10*3/MM3 (ref 3.4–10.8)
WBC UR QL AUTO: ABNORMAL /HPF

## 2019-10-23 PROCEDURE — 81001 URINALYSIS AUTO W/SCOPE: CPT | Performed by: ORTHOPAEDIC SURGERY

## 2019-10-23 PROCEDURE — 36415 COLL VENOUS BLD VENIPUNCTURE: CPT

## 2019-10-23 PROCEDURE — 93010 ELECTROCARDIOGRAM REPORT: CPT | Performed by: INTERNAL MEDICINE

## 2019-10-23 PROCEDURE — 85027 COMPLETE CBC AUTOMATED: CPT | Performed by: ORTHOPAEDIC SURGERY

## 2019-10-23 PROCEDURE — 93005 ELECTROCARDIOGRAM TRACING: CPT

## 2019-10-23 PROCEDURE — 80048 BASIC METABOLIC PNL TOTAL CA: CPT | Performed by: ORTHOPAEDIC SURGERY

## 2019-10-23 RX ORDER — CHLORHEXIDINE GLUCONATE 500 MG/1
CLOTH TOPICAL
COMMUNITY
End: 2020-01-22 | Stop reason: HOSPADM

## 2019-10-23 RX ORDER — WARFARIN SODIUM 2.5 MG/1
2.5 TABLET ORAL
COMMUNITY
End: 2020-01-13

## 2019-10-23 RX ORDER — HYDROCHLOROTHIAZIDE 25 MG/1
25 TABLET ORAL DAILY
COMMUNITY

## 2019-10-23 NOTE — DISCHARGE INSTRUCTIONS
2% CHLORAHEXIDINE GLUCONATE* CLOTH  Preparing or “prepping” skin before surgery can reduce the risk of infection at the surgical site. To make the process easier, [facility] has chosen disposable cloths moistened with a rinse-free, 2% Chlorhexidine Gluconate (CHG) antiseptic solution. The steps below outline the prepping process and should be carefully followed.        Use the prep cloth on the area that is circled in the diagram             Directions Night before Surgery  1) Shower using a fresh bar of anti-bacterial soap (such as Dial) and clean washcloth.  Use a clean towel to completely dry your skin.  2) Do not use any lotions, oils or creams on your skin.  3) Open the package and remove 1 cloth, wipe your skin for 30 seconds in a circular motion.  Allow to dry for 3 minutes.  4) Repeat #3 with second cloth.  5) Do not touch your eyes, ears, or mouth with the prep cloth.  6) Allow the wet prep solution to air dry.  7) Discard the prep cloth and wash your hands with soap and water.   8) Dress in clean bed clothes and sleep on fresh clean bed sheets.   9) You may experience some temporary itching after the prep.    Directions Day of Surgery  1) Repeat steps 1,2,3,4,5,6,7, and 9.   2) Dress in clean clothes before coming to the hospital.    BACTROBAN NASAL OINTMENT  There are many germs normally in your nose. Bactroban is an ointment that will help reduce these germs. Please follow these instructions for Bactroban use:      ____The day before surgery in the morning  Date________    ____The day before surgery in the evening              Date________    ____The day of surgery in the morning    Date________    **Squirt ½ package of Bactroban Ointment onto a cotton applicator and apply to inside of 1st nostril.  Squirt the remaining Bactroban and apply to the inside of the other nostril.        Take the following medications the morning of surgery with a small sip of water:  CARVEDILOL, AMLODIPINE,  AND PANTOPRAZOLE        FOLLOW MD INSTRUCTIONS REGARDING XARELTO/ WARFARIN    ARRIVAL TIME 0800 TO MAIN OR         General Instructions:  • Do not eat solid food after midnight the night before surgery.  • You may drink clear liquids day of surgery but must stop at least one hour before your hospital arrival time.  • It is beneficial for you to have a clear drink that contains carbohydrates the day of surgery.  We suggest a 12 to 20 ounce bottle of Gatorade or Powerade for non-diabetic patients or a 12 to 20 ounce bottle of G2 or Powerade Zero for diabetic patients. (Pediatric patients, are not advised to drink a 12 to 20 ounce carbohydrate drink)    Clear liquids are liquids you can see through.  Nothing red in color.     Plain water                               Sports drinks  Sodas                                   Gelatin (Jell-O)  Fruit juices without pulp such as white grape juice and apple juice  Popsicles that contain no fruit or yogurt  Tea or coffee (no cream or milk added)  Gatorade / Powerade  G2 / Powerade Zero    • Infants may have breast milk up to four hours before surgery.  • Infants drinking formula may drink formula up to six hours before surgery.   • Patients who avoid smoking, chewing tobacco and alcohol for 4 weeks prior to surgery have a reduced risk of post-operative complications.  Quit smoking as many days before surgery as you can.  • Do not smoke, use chewing tobacco or drink alcohol the day of surgery.   • If applicable bring your C-PAP/ BI-PAP machine.  • Bring any papers given to you in the doctor’s office.  • Wear clean comfortable clothes.  • Do not wear contact lenses, false eyelashes or make-up.  Bring a case for your glasses.   • Bring crutches or walker if applicable.  • Remove all piercings.  Leave jewelry and any other valuables at home.  • Hair extensions with metal clips must be removed prior to surgery.  • The Pre-Admission Testing nurse will instruct you to bring  medications if unable to obtain an accurate list in Pre-Admission Testing.            Preventing a Surgical Site Infection:  • For 2 to 3 days before surgery, avoid shaving with a razor because the razor can irritate skin and make it easier to develop an infection.    • Any areas of open skin can increase the risk of a post-operative wound infection by allowing bacteria to enter and travel throughout the body.  Notify your surgeon if you have any skin wounds / rashes even if it is not near the expected surgical site.  The area will need assessed to determine if surgery should be delayed until it is healed.  • The night prior to surgery sleep in a clean bed with clean clothing.  Do not allow pets to sleep with you.  • Shower on the morning of surgery using a fresh bar of anti-bacterial soap (such as Dial) and clean washcloth.  Dry with a clean towel and dress in clean clothing.  • Ask your surgeon if you will be receiving antibiotics prior to surgery.  • Make sure you, your family, and all healthcare providers clean their hands with soap and water or an alcohol based hand  before caring for you or your wound.    Day of surgery:  Your arrival time is approximately two hours before your scheduled surgery time.  Upon arrival, a Pre-op nurse and Anesthesiologist will review your health history, obtain vital signs, and answer questions you may have.  The only belongings needed at this time will be a list of your home medications and if applicable your C-PAP/BI-PAP machine.  If you are staying overnight your family can leave the rest of your belongings in the car and bring them to your room later.  A Pre-op nurse will start an IV and you may receive medication in preparation for surgery, including something to help you relax.  Your family will be able to see you in the Pre-op area.  Two visitors at a time will be allowed in the Pre-op room.  While you are in surgery your family should notify the waiting room   if they leave the waiting room area and provide a contact phone number.    Please be aware that surgery does come with discomfort.  We want to make every effort to control your discomfort so please discuss any uncontrolled symptoms with your nurse.   Your doctor will most likely have prescribed pain medications.      If you are going home after surgery you will receive individualized written care instructions before being discharged.  A responsible adult must drive you to and from the hospital on the day of your surgery and stay with you for 24 hours.    If you are staying overnight following surgery, you will be transported to your hospital room following the recovery period.  [facility] has all private rooms.    You have received a list of surgical assistants for your reference.  If you have any questions please call Pre-Admission Testing at [phone].  Deductibles and co-payments are collected on the day of service. Please be prepared to pay the required co-pay, deductible or deposit on the day of service as defined by your plan.

## 2019-10-24 ENCOUNTER — OFFICE VISIT (OUTPATIENT)
Dept: ORTHOPEDIC SURGERY | Facility: CLINIC | Age: 79
End: 2019-10-24

## 2019-10-24 VITALS
DIASTOLIC BLOOD PRESSURE: 69 MMHG | WEIGHT: 159 LBS | TEMPERATURE: 98.2 F | BODY MASS INDEX: 29.26 KG/M2 | HEIGHT: 62 IN | SYSTOLIC BLOOD PRESSURE: 154 MMHG

## 2019-10-24 DIAGNOSIS — M16.12 PRIMARY OSTEOARTHRITIS OF LEFT HIP: Primary | ICD-10-CM

## 2019-10-24 PROCEDURE — S0260 H&P FOR SURGERY: HCPCS | Performed by: NURSE PRACTITIONER

## 2019-10-24 NOTE — H&P
Patient: Kyler Horne    Date of Admission: 10/30/19    YOB: 1940    Medical Record Number: [id number]    Admitting Physician: Dr. Del Dumont    Reason for Admission: End Stage Left Hip OA    History of Present Illness: 79 y.o. female presents with severe end stage hip osteoarthritis which has not been responsive to the full compliment of conservative measures. Despite conservative attempts, there is still severe, constant activity limiting hip pain. Given the severity of the pain, the patient has elected to proceed with hip replacement.    Allergies:   Allergies   Allergen Reactions   • Ciprofloxacin Diarrhea   • Clavulanic Acid Itching   • Hydrocodone GI Intolerance         Current Medications:  Home Medications:    Current Outpatient Medications on File Prior to Visit   Medication Sig   • Acetaminophen (TYLENOL EXTRA STRENGTH PO) Take 1,000 mg by mouth As Needed.   • amLODIPine (NORVASC) 5 MG tablet Take 5 mg by mouth Every Morning.   • carvedilol (COREG) 3.125 MG tablet Take 3.125 mg by mouth 2 (two) times a day with meals.   • Chlorhexidine Gluconate Cloth 2 % pads Apply  topically. PRIOR TO OR   • colesevelam (WELCHOL) 625 MG tablet Take 625 mg by mouth 2 (Two) Times a Day With Meals.   • Cyanocobalamin (VITAMIN B 12 PO) Take 3,000 mcg by mouth Daily. HELD FOR OR   • FIBER SELECT GUMMIES PO Take 5 g by mouth Daily. HELD FOR OR   • hydroCHLOROthiazide (HYDRODIURIL) 25 MG tablet Take 25 mg by mouth Daily.   • insulin glargine (LANTUS) 100 UNIT/ML injection Inject 19 Units under the skin At Night As Needed. SLIDING SCALE   • [employer] LANCETS THIN 26G misc USE THREE TIMES A DAY TO TEST BLOOD SUGAR   • levothyroxine (SYNTHROID, LEVOTHROID) 100 MCG tablet Take 100 mcg by mouth Every Night.   • Multiple Vitamins-Minerals (ADULT ONE DAILY GUMMIES PO) Take 2 tablets by mouth Daily. HELD FOR OR   • mupirocin (BACTROBAN) 2 % nasal ointment into the nostril(s) as directed by provider 2 (Two) Times a Day.  PRIOR TO OR   • pantoprazole (PROTONIX) 40 MG EC tablet Take 40 mg by mouth Every Morning.   • pravastatin (PRAVACHOL) 40 MG tablet Take 40 mg by mouth Every Night.   • Probiotic Product (PROBIOTIC DAILY PO) Take 1 tablet by mouth Daily.   • rivaroxaban (XARELTO) 10 MG tablet Take 10 mg by mouth Daily. TO BEGIN ON 10/23/19 THROUGH Saturday 10/26/19 AS LAST DOSE PER CARDIO INSTRUCTIONS   • vitamin d (CHOLECALIFEROL) 5000 units capsule Take 5,000 Units by mouth Daily.   • warfarin (COUMADIN) 2.5 MG tablet Take 2.5 mg by mouth. Monday THROUGH Friday  HELD FOR OR PER MD INSTRUCTIONS     Current Facility-Administered Medications on File Prior to Visit   Medication   • mupirocin (BACTROBAN) 2 % nasal ointment     PRN Meds:.    PMH:  Past Medical History:   Diagnosis Date   • Diabetes mellitus (CMS/HCC)    • Diarrhea    • Difficulty sleeping    • Easy bruising    • GERD (gastroesophageal reflux disease)    • History of heart attack 2011, 2013   • History of kidney stones    • History of skin cancer    • Hyperlipidemia    • Hypertension    • Hypothyroidism    • Joint pain    • On anticoagulant therapy    • Osteoarthritis    • Paroxysmal atrial fibrillation (CMS/HCC)    • Ringing in ear    • Sleep apnea     CPAP        PSURGH:  Past Surgical History:   Procedure Laterality Date   • CHOLECYSTECTOMY     • COLON RESECTION  2011   • DILATATION AND CURETTAGE     • EXTRACORPOREAL SHOCK WAVE LITHOTRIPSY (ESWL)     • SKIN CANCER EXCISION     • TONSILLECTOMY     • TOTAL HIP ARTHROPLASTY Right 4/27/2018    Procedure: TOTAL HIP ARTHROPLASTY;  Surgeon: Del Dumont MD;  Location: [facility];  Service: Orthopedics       SocialHx:  Social History     Occupational History   • Not on file   Tobacco Use   • Smoking status: Former Smoker     Packs/day: 0.25     Years: 12.00     Pack years: 3.00     Types: Cigarettes   • Smokeless tobacco: Never Used   • Tobacco comment: QUIT OVER 50 YRS AGO   Substance and Sexual Activity   • Alcohol  "use: No   • Drug use: No   • Sexual activity: Defer      Social History     Social History Narrative   • Not on file       FamHx:  Family History   Problem Relation Age of Onset   • Breast cancer Mother    • Hypertension Mother    • Diabetes Mother    • Heart disease Father    • Diabetes Father    • Stroke Sister    • Lung cancer Sister    • Lung disease Sister    • Diabetes Sister    • Malig Hyperthermia Neg Hx          Review of Systems:   A 14 point review of systems was performed, pertinent positives discussed above, all other systems are negative    Physical Exam: 79 y.o. female  Vital Signs :   Vitals:    10/24/19 1423   BP: 154/69   Temp: 98.2 °F (36.8 °C)   Weight: 72.1 kg (159 lb)   Height: 157.5 cm (62\")     General: Alert and Oriented x 3, No acute distress.  Psych: mood and affect appropriate; recent and remote memory intact  Eyes: conjunctiva clear; pupils equally round and reactive, sclera nonicteric  CV: no peripheral edema  Resp: normal respiratory effort  Skin: no rashes or wounds; normal turgor  Musculosketetal; pain with hip range of motion. Positive stinchfeld test. No trochanteric  Tenderness.  Vascular: palpable distal pulses    Labs:    Appointment on 10/23/2019   Component Date Value Ref Range Status   • Glucose 10/23/2019 187* 65 - 99 mg/dL Final   • BUN 10/23/2019 18  8 - 23 mg/dL Final   • Creatinine 10/23/2019 1.16* 0.57 - 1.00 mg/dL Final   • Sodium 10/23/2019 140  136 - 145 mmol/L Final   • Potassium 10/23/2019 3.8  3.5 - 5.2 mmol/L Final   • Chloride 10/23/2019 106  98 - 107 mmol/L Final   • CO2 10/23/2019 21.8* 22.0 - 29.0 mmol/L Final   • Calcium 10/23/2019 9.2  8.6 - 10.5 mg/dL Final   • eGFR Non African Amer 10/23/2019 45* >60 mL/min/1.73 Final   • BUN/Creatinine Ratio 10/23/2019 15.5  7.0 - 25.0 Final   • Anion Gap 10/23/2019 12.2  5.0 - 15.0 mmol/L Final   • WBC 10/23/2019 12.86* 3.40 - 10.80 10*3/mm3 Final   • RBC 10/23/2019 5.21  3.77 - 5.28 10*6/mm3 Final   • Hemoglobin " 10/23/2019 16.4* 12.0 - 15.9 g/dL Final   • Hematocrit 10/23/2019 51.4* 34.0 - 46.6 % Final   • MCV 10/23/2019 98.7* 79.0 - 97.0 fL Final   • MCH 10/23/2019 31.5  26.6 - 33.0 pg Final   • MCHC 10/23/2019 31.9  31.5 - 35.7 g/dL Final   • RDW 10/23/2019 13.9  12.3 - 15.4 % Final   • RDW-SD 10/23/2019 50.4  37.0 - 54.0 fl Final   • MPV 10/23/2019 9.8  6.0 - 12.0 fL Final   • Platelets 10/23/2019 494* 140 - 450 10*3/mm3 Final   • Color, UA 10/23/2019 Yellow  Yellow, Straw Final   • Appearance, UA 10/23/2019 Clear  Clear Final   • pH, UA 10/23/2019 <=5.0  5.0 - 8.0 Final   • Specific Gravity, UA 10/23/2019 1.019  1.005 - 1.030 Final   • Glucose, UA 10/23/2019 Negative  Negative Final   • Ketones, UA 10/23/2019 Negative  Negative Final   • Bilirubin, UA 10/23/2019 Negative  Negative Final   • Blood, UA 10/23/2019 Negative  Negative Final   • Protein, UA 10/23/2019 Negative  Negative Final   • Leuk Esterase, UA 10/23/2019 Trace* Negative Final   • Nitrite, UA 10/23/2019 Negative  Negative Final   • Urobilinogen, UA 10/23/2019 0.2 E.U./dL  0.2 - 1.0 E.U./dL Final   • RBC, UA 10/23/2019 0-2  None Seen, 0-2 /HPF Final   • WBC, UA 10/23/2019 3-5* None Seen, 0-2 /HPF Final   • Bacteria, UA 10/23/2019 None Seen  None Seen /HPF Final   • Squamous Epithelial Cells, UA 10/23/2019 7-12* None Seen, 0-2 /HPF Final   • Hyaline Casts, UA 10/23/2019 None Seen  None Seen /LPF Final   • Methodology 10/23/2019 Automated Microscopy   Final     Xrays:  Xrays AP pelvis and a lateral of the Left hip were reviewed demonstrating  End stage hip OA with bone on bone articulation, subchondral cysts and periarticular osteophytes.    Assessment:  End-stage Left hip osteoarthritis. Conservative measures have failed.      Plan:  The plan is to proceed with Left Total Hip Replacement. The patient voiced understanding of the risks, benefits, and alternative forms of treatment that were discussed with Dr Dumont at the time of scheduling.  Patient's plan on  going home with home health after 1 to 2-day stay.  We will prescribe Xarelto postoperatively.  Patient does have a history of DVT    Samara Yen, APRN  10/24/2019

## 2019-10-28 ENCOUNTER — TELEPHONE (OUTPATIENT)
Dept: ORTHOPEDIC SURGERY | Facility: CLINIC | Age: 79
End: 2019-10-28

## 2019-10-28 NOTE — TELEPHONE ENCOUNTER
Should be okay as long as she does not have any open sores, signs of infection and is able to walk and do physical therapy

## 2019-10-30 ENCOUNTER — HOSPITAL ENCOUNTER (INPATIENT)
Facility: HOSPITAL | Age: 79
LOS: 1 days | Discharge: HOME | End: 2019-10-30
Attending: ORTHOPAEDIC SURGERY | Admitting: ORTHOPAEDIC SURGERY

## 2019-10-30 ENCOUNTER — ANESTHESIA (OUTPATIENT)
Dept: PERIOP | Facility: HOSPITAL | Age: 79
End: 2019-10-30

## 2019-10-30 ENCOUNTER — ANESTHESIA EVENT (OUTPATIENT)
Dept: PERIOP | Facility: HOSPITAL | Age: 79
End: 2019-10-30

## 2019-10-30 DIAGNOSIS — M16.12 PRIMARY OSTEOARTHRITIS OF LEFT HIP: ICD-10-CM

## 2019-10-30 LAB — GLUCOSE BLDC GLUCOMTR-MCNC: 147 MG/DL (ref 70–130)

## 2019-10-30 PROCEDURE — 82962 GLUCOSE BLOOD TEST: CPT

## 2019-10-30 PROCEDURE — G0463 HOSPITAL OUTPT CLINIC VISIT: HCPCS | Performed by: ORTHOPAEDIC SURGERY

## 2019-10-30 RX ORDER — VANCOMYCIN HYDROCHLORIDE 1 G/200ML
15 INJECTION, SOLUTION INTRAVENOUS ONCE
Status: DISCONTINUED | OUTPATIENT
Start: 2019-10-30 | End: 2019-10-30 | Stop reason: HOSPADM

## 2019-10-30 RX ORDER — ACETAMINOPHEN 10 MG/ML
1000 INJECTION, SOLUTION INTRAVENOUS ONCE
Status: DISCONTINUED | OUTPATIENT
Start: 2019-10-30 | End: 2019-10-30 | Stop reason: HOSPADM

## 2019-10-30 RX ORDER — MELOXICAM 15 MG/1
15 TABLET ORAL ONCE
Status: DISCONTINUED | OUTPATIENT
Start: 2019-10-30 | End: 2019-10-30 | Stop reason: HOSPADM

## 2019-10-30 RX ORDER — PREGABALIN 75 MG/1
150 CAPSULE ORAL ONCE
Status: DISCONTINUED | OUTPATIENT
Start: 2019-10-30 | End: 2019-10-30 | Stop reason: HOSPADM

## 2019-10-30 RX ORDER — CEFAZOLIN SODIUM 2 G/100ML
2 INJECTION, SOLUTION INTRAVENOUS ONCE
Status: DISCONTINUED | OUTPATIENT
Start: 2019-10-30 | End: 2019-10-30 | Stop reason: HOSPADM

## 2020-01-03 ENCOUNTER — TELEPHONE (OUTPATIENT)
Dept: ORTHOPEDIC SURGERY | Facility: CLINIC | Age: 80
End: 2020-01-03

## 2020-01-06 DIAGNOSIS — M16.12 PRIMARY OSTEOARTHRITIS OF LEFT HIP: Primary | ICD-10-CM

## 2020-01-06 RX ORDER — VANCOMYCIN HYDROCHLORIDE 1 G/200ML
15 INJECTION, SOLUTION INTRAVENOUS ONCE
Status: CANCELLED | OUTPATIENT
Start: 2020-01-20 | End: 2020-01-06

## 2020-01-06 RX ORDER — MELOXICAM 15 MG/1
15 TABLET ORAL ONCE
Status: CANCELLED | OUTPATIENT
Start: 2020-01-20 | End: 2020-01-06

## 2020-01-06 RX ORDER — CEFAZOLIN SODIUM 2 G/100ML
2 INJECTION, SOLUTION INTRAVENOUS ONCE
Status: CANCELLED | OUTPATIENT
Start: 2020-01-20 | End: 2020-01-06

## 2020-01-06 RX ORDER — PREGABALIN 75 MG/1
150 CAPSULE ORAL ONCE
Status: CANCELLED | OUTPATIENT
Start: 2020-01-20 | End: 2020-01-06

## 2020-01-10 ENCOUNTER — TELEPHONE (OUTPATIENT)
Dept: ORTHOPEDIC SURGERY | Facility: CLINIC | Age: 80
End: 2020-01-10

## 2020-01-10 NOTE — TELEPHONE ENCOUNTER
Call returned to the patient.  I was unable to reach her but I did leave a message on her answering machine for her to contact me at the office.  She had questions about her Xarelto.  Patient has been cleared by her cardiologist and in his note it says he request that she stop her Xarelto 3 days prior to surgery.  All that information had been left on the patient's voicemail however have left it open for her to call if she has any other questions or concerns

## 2020-01-13 ENCOUNTER — APPOINTMENT (OUTPATIENT)
Dept: PREADMISSION TESTING | Facility: HOSPITAL | Age: 80
End: 2020-01-13

## 2020-01-13 VITALS
HEART RATE: 64 BPM | SYSTOLIC BLOOD PRESSURE: 147 MMHG | HEIGHT: 62 IN | TEMPERATURE: 97.6 F | DIASTOLIC BLOOD PRESSURE: 80 MMHG | RESPIRATION RATE: 18 BRPM | WEIGHT: 156 LBS | BODY MASS INDEX: 28.71 KG/M2 | OXYGEN SATURATION: 95 %

## 2020-01-13 DIAGNOSIS — M16.12 PRIMARY OSTEOARTHRITIS OF LEFT HIP: ICD-10-CM

## 2020-01-13 LAB
ANION GAP SERPL CALCULATED.3IONS-SCNC: 12.8 MMOL/L (ref 5–15)
BACTERIA UR QL AUTO: ABNORMAL /HPF
BILIRUB UR QL STRIP: NEGATIVE
BUN BLD-MCNC: 25 MG/DL (ref 8–23)
BUN/CREAT SERPL: 18.2 (ref 7–25)
CALCIUM SPEC-SCNC: 9.5 MG/DL (ref 8.6–10.5)
CHLORIDE SERPL-SCNC: 102 MMOL/L (ref 98–107)
CLARITY UR: CLEAR
CO2 SERPL-SCNC: 24.2 MMOL/L (ref 22–29)
COLOR UR: YELLOW
CREAT BLD-MCNC: 1.37 MG/DL (ref 0.57–1)
DEPRECATED RDW RBC AUTO: 53 FL (ref 37–54)
ERYTHROCYTE [DISTWIDTH] IN BLOOD BY AUTOMATED COUNT: 15 % (ref 12.3–15.4)
GFR SERPL CREATININE-BSD FRML MDRD: 37 ML/MIN/1.73
GLUCOSE BLD-MCNC: 148 MG/DL (ref 65–99)
GLUCOSE UR STRIP-MCNC: NEGATIVE MG/DL
HCT VFR BLD AUTO: 48.5 % (ref 34–46.6)
HGB BLD-MCNC: 15.8 G/DL (ref 12–15.9)
HGB UR QL STRIP.AUTO: NEGATIVE
HYALINE CASTS UR QL AUTO: ABNORMAL /LPF
KETONES UR QL STRIP: NEGATIVE
LEUKOCYTE ESTERASE UR QL STRIP.AUTO: ABNORMAL
MCH RBC QN AUTO: 32 PG (ref 26.6–33)
MCHC RBC AUTO-ENTMCNC: 32.6 G/DL (ref 31.5–35.7)
MCV RBC AUTO: 98.2 FL (ref 79–97)
NITRITE UR QL STRIP: NEGATIVE
PH UR STRIP.AUTO: <=5 [PH] (ref 5–8)
PLATELET # BLD AUTO: 484 10*3/MM3 (ref 140–450)
PMV BLD AUTO: 9.7 FL (ref 6–12)
POTASSIUM BLD-SCNC: 4.2 MMOL/L (ref 3.5–5.2)
PROT UR QL STRIP: NEGATIVE
RBC # BLD AUTO: 4.94 10*6/MM3 (ref 3.77–5.28)
RBC # UR: ABNORMAL /HPF
REF LAB TEST METHOD: ABNORMAL
SODIUM BLD-SCNC: 139 MMOL/L (ref 136–145)
SP GR UR STRIP: 1.02 (ref 1–1.03)
SQUAMOUS #/AREA URNS HPF: ABNORMAL /HPF
UROBILINOGEN UR QL STRIP: ABNORMAL
WBC NRBC COR # BLD: 12.52 10*3/MM3 (ref 3.4–10.8)
WBC UR QL AUTO: ABNORMAL /HPF

## 2020-01-13 PROCEDURE — 81001 URINALYSIS AUTO W/SCOPE: CPT | Performed by: ORTHOPAEDIC SURGERY

## 2020-01-13 PROCEDURE — 87086 URINE CULTURE/COLONY COUNT: CPT | Performed by: ORTHOPAEDIC SURGERY

## 2020-01-13 PROCEDURE — 87077 CULTURE AEROBIC IDENTIFY: CPT | Performed by: ORTHOPAEDIC SURGERY

## 2020-01-13 PROCEDURE — 63710000001 MUPIROCIN 2 % OINTMENT: Performed by: ORTHOPAEDIC SURGERY

## 2020-01-13 PROCEDURE — 80048 BASIC METABOLIC PNL TOTAL CA: CPT | Performed by: ORTHOPAEDIC SURGERY

## 2020-01-13 PROCEDURE — 87186 SC STD MICRODIL/AGAR DIL: CPT | Performed by: ORTHOPAEDIC SURGERY

## 2020-01-13 PROCEDURE — 85027 COMPLETE CBC AUTOMATED: CPT | Performed by: ORTHOPAEDIC SURGERY

## 2020-01-13 PROCEDURE — A9270 NON-COVERED ITEM OR SERVICE: HCPCS | Performed by: ORTHOPAEDIC SURGERY

## 2020-01-13 PROCEDURE — 36415 COLL VENOUS BLD VENIPUNCTURE: CPT

## 2020-01-13 ASSESSMENT — HOOS JR
HOOS JR SCORE: 14
HOOS JR SCORE: 46.652

## 2020-01-15 LAB — BACTERIA SPEC AEROBE CULT: ABNORMAL

## 2020-01-16 ENCOUNTER — OFFICE VISIT (OUTPATIENT)
Dept: ORTHOPEDIC SURGERY | Facility: CLINIC | Age: 80
End: 2020-01-16

## 2020-01-16 VITALS — WEIGHT: 156 LBS | HEIGHT: 62 IN | BODY MASS INDEX: 28.71 KG/M2 | TEMPERATURE: 97.3 F

## 2020-01-16 DIAGNOSIS — M16.12 PRIMARY OSTEOARTHRITIS OF LEFT HIP: Primary | ICD-10-CM

## 2020-01-16 PROCEDURE — 73502 X-RAY EXAM HIP UNI 2-3 VIEWS: CPT | Performed by: NURSE PRACTITIONER

## 2020-01-16 PROCEDURE — S0260 H&P FOR SURGERY: HCPCS | Performed by: NURSE PRACTITIONER

## 2020-01-16 RX ORDER — NITROFURANTOIN 25; 75 MG/1; MG/1
100 CAPSULE ORAL 2 TIMES DAILY
Qty: 14 CAPSULE | Refills: 0 | OUTPATIENT
Start: 2020-01-16

## 2020-01-16 NOTE — H&P (VIEW-ONLY)
Patient: Kyler Horne    Date of Admission: 1/20/20    YOB: 1940    Medical Record Number: [id number]    Admitting Physician: Dr. Del Dumont    Reason for Admission: End Stage Left Hip OA    History of Present Illness: 79 y.o. female presents with severe end stage hip osteoarthritis which has not been responsive to the full compliment of conservative measures. Despite conservative attempts, there is still severe, constant activity limiting hip pain. Given the severity of the pain, the patient has elected to proceed with hip replacement.    Allergies:   Allergies   Allergen Reactions   • Ciprofloxacin Diarrhea   • Clavulanic Acid Itching   • Hydrocodone GI Intolerance         Current Medications:  Home Medications:    Current Outpatient Medications on File Prior to Visit   Medication Sig   • Acetaminophen (TYLENOL EXTRA STRENGTH PO) Take 1,000 mg by mouth As Needed.   • amLODIPine (NORVASC) 5 MG tablet Take 5 mg by mouth Every Morning.   • BIOTIN PO Take 1 tablet by mouth Daily.   • carvedilol (COREG) 3.125 MG tablet Take 3.125 mg by mouth 2 (two) times a day with meals.   • Chlorhexidine Gluconate Cloth 2 % pads Apply  topically. PRIOR TO OR   • colesevelam (WELCHOL) 625 MG tablet Take 625 mg by mouth Daily.   • Cyanocobalamin (VITAMIN B 12 PO) Take 3,000 mcg by mouth Daily. HELD FOR OR   • FIBER SELECT GUMMIES PO Take 5 g by mouth As Needed.   • hydroCHLOROthiazide (HYDRODIURIL) 25 MG tablet Take 25 mg by mouth Daily.   • insulin glargine (LANTUS) 100 UNIT/ML injection Inject 19 Units under the skin At Night As Needed. SLIDING SCALE   • [employer] LANCETS THIN 26G misc USE THREE TIMES A DAY TO TEST BLOOD SUGAR   • levothyroxine (SYNTHROID, LEVOTHROID) 100 MCG tablet Take 100 mcg by mouth Every Night.   • Multiple Vitamins-Minerals (ADULT ONE DAILY GUMMIES PO) Take 2 tablets by mouth Daily. HELD FOR OR   • mupirocin (BACTROBAN) 2 % nasal ointment into the nostril(s) as directed by provider 2 (Two)  Times a Day. PRIOR TO OR   • pantoprazole (PROTONIX) 40 MG EC tablet Take 40 mg by mouth Every Morning.   • pravastatin (PRAVACHOL) 40 MG tablet Take 40 mg by mouth Every Night.   • Probiotic Product (PROBIOTIC DAILY PO) Take 1 tablet by mouth Daily.   • rivaroxaban (XARELTO) 10 MG tablet Take 15 mg by mouth Every Evening. PT TO HOLD 3 DAYS PRIOR TO SURGERY   • vitamin d (CHOLECALIFEROL) 5000 units capsule Take 5,000 Units by mouth Daily.     No current facility-administered medications on file prior to visit.      PRN Meds:.    PMH:  Past Medical History:   Diagnosis Date   • Diabetes mellitus (CMS/HCC)    • Diarrhea    • Difficulty sleeping    • Easy bruising    • GERD (gastroesophageal reflux disease)    • History of heart attack 2011, 2013   • History of kidney stones    • History of skin cancer    • Hyperlipidemia    • Hypertension    • Hypothyroidism    • Joint pain    • On anticoagulant therapy    • Osteoarthritis    • Paroxysmal atrial fibrillation (CMS/HCC)    • Ringing in ear    • Sleep apnea     CPAP        PSURGH:  Past Surgical History:   Procedure Laterality Date   • CHOLECYSTECTOMY     • COLON RESECTION  2011   • DILATATION AND CURETTAGE     • EXTRACORPOREAL SHOCK WAVE LITHOTRIPSY (ESWL)     • SKIN CANCER EXCISION     • TONSILLECTOMY     • TOTAL HIP ARTHROPLASTY Right 4/27/2018    Procedure: TOTAL HIP ARTHROPLASTY;  Surgeon: Del Dumont MD;  Location: [facility];  Service: Orthopedics       SocialHx:  Social History     Occupational History   • Not on file   Tobacco Use   • Smoking status: Former Smoker     Packs/day: 0.25     Years: 12.00     Pack years: 3.00     Types: Cigarettes   • Smokeless tobacco: Never Used   • Tobacco comment: QUIT OVER 50 YRS AGO   Substance and Sexual Activity   • Alcohol use: No   • Drug use: No   • Sexual activity: Defer      Social History     Social History Narrative   • Not on file       FamHx:  Family History   Problem Relation Age of Onset   • Breast cancer  "Mother    • Hypertension Mother    • Diabetes Mother    • Heart disease Father    • Diabetes Father    • Stroke Sister    • Lung cancer Sister    • Lung disease Sister    • Diabetes Sister    • Malig Hyperthermia Neg Hx          Review of Systems:   A 14 point review of systems was performed, pertinent positives discussed above, all other systems are negative    Physical Exam: 79 y.o. female  Vital Signs :   Vitals:    01/16/20 1305   Temp: 97.3 °F (36.3 °C)   Weight: 70.8 kg (156 lb)   Height: 157.5 cm (62\")     General: Alert and Oriented x 3, No acute distress.  Psych: mood and affect appropriate; recent and remote memory intact  Eyes: conjunctiva clear; pupils equally round and reactive, sclera nonicteric  CV: no peripheral edema  Resp: normal respiratory effort  Skin: no rashes or wounds; normal turgor  Musculosketetal; pain with hip range of motion. Positive stinchfeld test. No trochanteric  Tenderness.  Vascular: palpable distal pulses    Labs:    Appointment on 01/13/2020   Component Date Value Ref Range Status   • Glucose 01/13/2020 148* 65 - 99 mg/dL Final   • BUN 01/13/2020 25* 8 - 23 mg/dL Final   • Creatinine 01/13/2020 1.37* 0.57 - 1.00 mg/dL Final   • Sodium 01/13/2020 139  136 - 145 mmol/L Final   • Potassium 01/13/2020 4.2  3.5 - 5.2 mmol/L Final   • Chloride 01/13/2020 102  98 - 107 mmol/L Final   • CO2 01/13/2020 24.2  22.0 - 29.0 mmol/L Final   • Calcium 01/13/2020 9.5  8.6 - 10.5 mg/dL Final   • eGFR Non African Amer 01/13/2020 37* >60 mL/min/1.73 Final   • BUN/Creatinine Ratio 01/13/2020 18.2  7.0 - 25.0 Final   • Anion Gap 01/13/2020 12.8  5.0 - 15.0 mmol/L Final   • WBC 01/13/2020 12.52* 3.40 - 10.80 10*3/mm3 Final   • RBC 01/13/2020 4.94  3.77 - 5.28 10*6/mm3 Final   • Hemoglobin 01/13/2020 15.8  12.0 - 15.9 g/dL Final   • Hematocrit 01/13/2020 48.5* 34.0 - 46.6 % Final   • MCV 01/13/2020 98.2* 79.0 - 97.0 fL Final   • MCH 01/13/2020 32.0  26.6 - 33.0 pg Final   • MCHC 01/13/2020 32.6  31.5 " - 35.7 g/dL Final   • RDW 01/13/2020 15.0  12.3 - 15.4 % Final   • RDW-SD 01/13/2020 53.0  37.0 - 54.0 fl Final   • MPV 01/13/2020 9.7  6.0 - 12.0 fL Final   • Platelets 01/13/2020 484* 140 - 450 10*3/mm3 Final   • Color, UA 01/13/2020 Yellow  Yellow, Straw Final   • Appearance, UA 01/13/2020 Clear  Clear Final   • pH, UA 01/13/2020 <=5.0  5.0 - 8.0 Final   • Specific Gravity, UA 01/13/2020 1.016  1.005 - 1.030 Final   • Glucose, UA 01/13/2020 Negative  Negative Final   • Ketones, UA 01/13/2020 Negative  Negative Final   • Bilirubin, UA 01/13/2020 Negative  Negative Final   • Blood, UA 01/13/2020 Negative  Negative Final   • Protein, UA 01/13/2020 Negative  Negative Final   • Leuk Esterase, UA 01/13/2020 Small (1+)* Negative Final   • Nitrite, UA 01/13/2020 Negative  Negative Final   • Urobilinogen, UA 01/13/2020 0.2 E.U./dL  0.2 - 1.0 E.U./dL Final   • RBC, UA 01/13/2020 None Seen  None Seen, 0-2 /HPF Final   • WBC, UA 01/13/2020 13-20* None Seen, 0-2 /HPF Final   • Bacteria, UA 01/13/2020 1+* None Seen /HPF Final   • Squamous Epithelial Cells, UA 01/13/2020 3-6* None Seen, 0-2 /HPF Final   • Hyaline Casts, UA 01/13/2020 None Seen  None Seen /LPF Final   • Methodology 01/13/2020 Manual Light Microscopy   Final   • Urine Culture 01/13/2020 >100,000 CFU/mL Escherichia coli*  Final     Xrays:  Xrays AP pelvis and a lateral of the Left hip were ordered and reviewed demonstrating  End stage hip OA with bone on bone articulation, subchondral cysts and periarticular osteophytes.    Assessment:  End-stage Left hip osteoarthritis. Conservative measures have failed.      Plan:  The plan is to proceed with Left Total Hip Replacement. The patient voiced understanding of the risks, benefits, and alternative forms of treatment that were discussed with Dr Dumont at the time of scheduling.  Patient has a history of DVT and is on Xarelto, we will resume that postop.  T acid local.  Also she would like low-dose hydrocodone postop for  pain, she is not allergic and has since just ends up with stomach upset with high doses.  She did well with this in the past.  She is plan on going home with home health after 1 to 2-day stay.  In addition she did have bacteria in her urine and we are calling a prescription for Macrobid 1 p.o. twice daily #14 0 refills    Samara Yen, APRN  1/16/2020

## 2020-01-16 NOTE — TELEPHONE ENCOUNTER
----- Message from SARAH Brown sent at 1/16/2020  1:06 PM EST -----  Please let patient know that she does have bacteria in her urine.  Please call in prescription for Macrobid 1 p.o. twice daily #14.  Thank you

## 2020-01-16 NOTE — H&P
Patient: Kyler Horne    Date of Admission: 1/20/20    YOB: 1940    Medical Record Number: [id number]    Admitting Physician: Dr. Del Dumont    Reason for Admission: End Stage Left Hip OA    History of Present Illness: 79 y.o. female presents with severe end stage hip osteoarthritis which has not been responsive to the full compliment of conservative measures. Despite conservative attempts, there is still severe, constant activity limiting hip pain. Given the severity of the pain, the patient has elected to proceed with hip replacement.    Allergies:   Allergies   Allergen Reactions   • Ciprofloxacin Diarrhea   • Clavulanic Acid Itching   • Hydrocodone GI Intolerance         Current Medications:  Home Medications:    Current Outpatient Medications on File Prior to Visit   Medication Sig   • Acetaminophen (TYLENOL EXTRA STRENGTH PO) Take 1,000 mg by mouth As Needed.   • amLODIPine (NORVASC) 5 MG tablet Take 5 mg by mouth Every Morning.   • BIOTIN PO Take 1 tablet by mouth Daily.   • carvedilol (COREG) 3.125 MG tablet Take 3.125 mg by mouth 2 (two) times a day with meals.   • Chlorhexidine Gluconate Cloth 2 % pads Apply  topically. PRIOR TO OR   • colesevelam (WELCHOL) 625 MG tablet Take 625 mg by mouth Daily.   • Cyanocobalamin (VITAMIN B 12 PO) Take 3,000 mcg by mouth Daily. HELD FOR OR   • FIBER SELECT GUMMIES PO Take 5 g by mouth As Needed.   • hydroCHLOROthiazide (HYDRODIURIL) 25 MG tablet Take 25 mg by mouth Daily.   • insulin glargine (LANTUS) 100 UNIT/ML injection Inject 19 Units under the skin At Night As Needed. SLIDING SCALE   • KR[employer]ER LANCETS THIN 26G misc USE THREE TIMES A DAY TO TEST BLOOD SUGAR   • levothyroxine (SYNTHROID, LEVOTHROID) 100 MCG tablet Take 100 mcg by mouth Every Night.   • Multiple Vitamins-Minerals (ADULT ONE DAILY GUMMIES PO) Take 2 tablets by mouth Daily. HELD FOR OR   • mupirocin (BACTROBAN) 2 % nasal ointment into the nostril(s) as directed by provider 2 (Two)  Times a Day. PRIOR TO OR   • pantoprazole (PROTONIX) 40 MG EC tablet Take 40 mg by mouth Every Morning.   • pravastatin (PRAVACHOL) 40 MG tablet Take 40 mg by mouth Every Night.   • Probiotic Product (PROBIOTIC DAILY PO) Take 1 tablet by mouth Daily.   • rivaroxaban (XARELTO) 10 MG tablet Take 15 mg by mouth Every Evening. PT TO HOLD 3 DAYS PRIOR TO SURGERY   • vitamin d (CHOLECALIFEROL) 5000 units capsule Take 5,000 Units by mouth Daily.     No current facility-administered medications on file prior to visit.      PRN Meds:.    PMH:  Past Medical History:   Diagnosis Date   • Diabetes mellitus (CMS/HCC)    • Diarrhea    • Difficulty sleeping    • Easy bruising    • GERD (gastroesophageal reflux disease)    • History of heart attack 2011, 2013   • History of kidney stones    • History of skin cancer    • Hyperlipidemia    • Hypertension    • Hypothyroidism    • Joint pain    • On anticoagulant therapy    • Osteoarthritis    • Paroxysmal atrial fibrillation (CMS/HCC)    • Ringing in ear    • Sleep apnea     CPAP        PSURGH:  Past Surgical History:   Procedure Laterality Date   • CHOLECYSTECTOMY     • COLON RESECTION  2011   • DILATATION AND CURETTAGE     • EXTRACORPOREAL SHOCK WAVE LITHOTRIPSY (ESWL)     • SKIN CANCER EXCISION     • TONSILLECTOMY     • TOTAL HIP ARTHROPLASTY Right 4/27/2018    Procedure: TOTAL HIP ARTHROPLASTY;  Surgeon: Del Dumont MD;  Location: [facility];  Service: Orthopedics       SocialHx:  Social History     Occupational History   • Not on file   Tobacco Use   • Smoking status: Former Smoker     Packs/day: 0.25     Years: 12.00     Pack years: 3.00     Types: Cigarettes   • Smokeless tobacco: Never Used   • Tobacco comment: QUIT OVER 50 YRS AGO   Substance and Sexual Activity   • Alcohol use: No   • Drug use: No   • Sexual activity: Defer      Social History     Social History Narrative   • Not on file       FamHx:  Family History   Problem Relation Age of Onset   • Breast cancer  "Mother    • Hypertension Mother    • Diabetes Mother    • Heart disease Father    • Diabetes Father    • Stroke Sister    • Lung cancer Sister    • Lung disease Sister    • Diabetes Sister    • Malig Hyperthermia Neg Hx          Review of Systems:   A 14 point review of systems was performed, pertinent positives discussed above, all other systems are negative    Physical Exam: 79 y.o. female  Vital Signs :   Vitals:    01/16/20 1305   Temp: 97.3 °F (36.3 °C)   Weight: 70.8 kg (156 lb)   Height: 157.5 cm (62\")     General: Alert and Oriented x 3, No acute distress.  Psych: mood and affect appropriate; recent and remote memory intact  Eyes: conjunctiva clear; pupils equally round and reactive, sclera nonicteric  CV: no peripheral edema  Resp: normal respiratory effort  Skin: no rashes or wounds; normal turgor  Musculosketetal; pain with hip range of motion. Positive stinchfeld test. No trochanteric  Tenderness.  Vascular: palpable distal pulses    Labs:    Appointment on 01/13/2020   Component Date Value Ref Range Status   • Glucose 01/13/2020 148* 65 - 99 mg/dL Final   • BUN 01/13/2020 25* 8 - 23 mg/dL Final   • Creatinine 01/13/2020 1.37* 0.57 - 1.00 mg/dL Final   • Sodium 01/13/2020 139  136 - 145 mmol/L Final   • Potassium 01/13/2020 4.2  3.5 - 5.2 mmol/L Final   • Chloride 01/13/2020 102  98 - 107 mmol/L Final   • CO2 01/13/2020 24.2  22.0 - 29.0 mmol/L Final   • Calcium 01/13/2020 9.5  8.6 - 10.5 mg/dL Final   • eGFR Non African Amer 01/13/2020 37* >60 mL/min/1.73 Final   • BUN/Creatinine Ratio 01/13/2020 18.2  7.0 - 25.0 Final   • Anion Gap 01/13/2020 12.8  5.0 - 15.0 mmol/L Final   • WBC 01/13/2020 12.52* 3.40 - 10.80 10*3/mm3 Final   • RBC 01/13/2020 4.94  3.77 - 5.28 10*6/mm3 Final   • Hemoglobin 01/13/2020 15.8  12.0 - 15.9 g/dL Final   • Hematocrit 01/13/2020 48.5* 34.0 - 46.6 % Final   • MCV 01/13/2020 98.2* 79.0 - 97.0 fL Final   • MCH 01/13/2020 32.0  26.6 - 33.0 pg Final   • MCHC 01/13/2020 32.6  31.5 " - 35.7 g/dL Final   • RDW 01/13/2020 15.0  12.3 - 15.4 % Final   • RDW-SD 01/13/2020 53.0  37.0 - 54.0 fl Final   • MPV 01/13/2020 9.7  6.0 - 12.0 fL Final   • Platelets 01/13/2020 484* 140 - 450 10*3/mm3 Final   • Color, UA 01/13/2020 Yellow  Yellow, Straw Final   • Appearance, UA 01/13/2020 Clear  Clear Final   • pH, UA 01/13/2020 <=5.0  5.0 - 8.0 Final   • Specific Gravity, UA 01/13/2020 1.016  1.005 - 1.030 Final   • Glucose, UA 01/13/2020 Negative  Negative Final   • Ketones, UA 01/13/2020 Negative  Negative Final   • Bilirubin, UA 01/13/2020 Negative  Negative Final   • Blood, UA 01/13/2020 Negative  Negative Final   • Protein, UA 01/13/2020 Negative  Negative Final   • Leuk Esterase, UA 01/13/2020 Small (1+)* Negative Final   • Nitrite, UA 01/13/2020 Negative  Negative Final   • Urobilinogen, UA 01/13/2020 0.2 E.U./dL  0.2 - 1.0 E.U./dL Final   • RBC, UA 01/13/2020 None Seen  None Seen, 0-2 /HPF Final   • WBC, UA 01/13/2020 13-20* None Seen, 0-2 /HPF Final   • Bacteria, UA 01/13/2020 1+* None Seen /HPF Final   • Squamous Epithelial Cells, UA 01/13/2020 3-6* None Seen, 0-2 /HPF Final   • Hyaline Casts, UA 01/13/2020 None Seen  None Seen /LPF Final   • Methodology 01/13/2020 Manual Light Microscopy   Final   • Urine Culture 01/13/2020 >100,000 CFU/mL Escherichia coli*  Final     Xrays:  Xrays AP pelvis and a lateral of the Left hip were ordered and reviewed demonstrating  End stage hip OA with bone on bone articulation, subchondral cysts and periarticular osteophytes.    Assessment:  End-stage Left hip osteoarthritis. Conservative measures have failed.      Plan:  The plan is to proceed with Left Total Hip Replacement. The patient voiced understanding of the risks, benefits, and alternative forms of treatment that were discussed with Dr Dumont at the time of scheduling.  Patient has a history of DVT and is on Xarelto, we will resume that postop.  T acid local.  Also she would like low-dose hydrocodone postop for  pain, she is not allergic and has since just ends up with stomach upset with high doses.  She did well with this in the past.  She is plan on going home with home health after 1 to 2-day stay.  In addition she did have bacteria in her urine and we are calling a prescription for Macrobid 1 p.o. twice daily #14 0 refills    Samara Yen, APRN  1/16/2020

## 2020-01-20 ENCOUNTER — HOSPITAL ENCOUNTER (INPATIENT)
Facility: HOSPITAL | Age: 80
LOS: 2 days | Discharge: HOME HEALTH | End: 2020-01-22
Attending: ORTHOPAEDIC SURGERY | Admitting: ORTHOPAEDIC SURGERY

## 2020-01-20 ENCOUNTER — ANESTHESIA (OUTPATIENT)
Dept: PERIOP | Facility: HOSPITAL | Age: 80
End: 2020-01-20

## 2020-01-20 ENCOUNTER — ANESTHESIA EVENT (OUTPATIENT)
Dept: PERIOP | Facility: HOSPITAL | Age: 80
End: 2020-01-20

## 2020-01-20 ENCOUNTER — APPOINTMENT (OUTPATIENT)
Dept: GENERAL RADIOLOGY | Facility: HOSPITAL | Age: 80
End: 2020-01-20

## 2020-01-20 DIAGNOSIS — M16.12 PRIMARY OSTEOARTHRITIS OF LEFT HIP: ICD-10-CM

## 2020-01-20 LAB
ABO GROUP BLD: NORMAL
BLD GP AB SCN SERPL QL: NEGATIVE
GLUCOSE BLDC GLUCOMTR-MCNC: 123 MG/DL (ref 70–130)
GLUCOSE BLDC GLUCOMTR-MCNC: 130 MG/DL (ref 70–130)
GLUCOSE BLDC GLUCOMTR-MCNC: 159 MG/DL (ref 70–130)
RH BLD: POSITIVE
T&S EXPIRATION DATE: NORMAL

## 2020-01-20 PROCEDURE — 25010000002 HYDROMORPHONE PER 4 MG: Performed by: NURSE ANESTHETIST, CERTIFIED REGISTERED

## 2020-01-20 PROCEDURE — C9290 INJ, BUPIVACAINE LIPOSOME: HCPCS | Performed by: ORTHOPAEDIC SURGERY

## 2020-01-20 PROCEDURE — 25010000002 ONDANSETRON PER 1 MG: Performed by: NURSE ANESTHETIST, CERTIFIED REGISTERED

## 2020-01-20 PROCEDURE — 25010000002 FENTANYL CITRATE (PF) 100 MCG/2ML SOLUTION: Performed by: NURSE ANESTHETIST, CERTIFIED REGISTERED

## 2020-01-20 PROCEDURE — 25010000002 PROPOFOL 10 MG/ML EMULSION: Performed by: NURSE ANESTHETIST, CERTIFIED REGISTERED

## 2020-01-20 PROCEDURE — C1776 JOINT DEVICE (IMPLANTABLE): HCPCS | Performed by: ORTHOPAEDIC SURGERY

## 2020-01-20 PROCEDURE — 25010000003 BUPIVACAINE LIPOSOME 1.3 % SUSPENSION 20 ML VIAL: Performed by: ORTHOPAEDIC SURGERY

## 2020-01-20 PROCEDURE — 86850 RBC ANTIBODY SCREEN: CPT | Performed by: ORTHOPAEDIC SURGERY

## 2020-01-20 PROCEDURE — 25010000002 PHENYLEPHRINE PER 1 ML: Performed by: NURSE ANESTHETIST, CERTIFIED REGISTERED

## 2020-01-20 PROCEDURE — 25010000002 DEXAMETHASONE PER 1 MG: Performed by: NURSE ANESTHETIST, CERTIFIED REGISTERED

## 2020-01-20 PROCEDURE — 25010000002 FENTANYL CITRATE (PF) 100 MCG/2ML SOLUTION: Performed by: ANESTHESIOLOGY

## 2020-01-20 PROCEDURE — 73501 X-RAY EXAM HIP UNI 1 VIEW: CPT

## 2020-01-20 PROCEDURE — 86901 BLOOD TYPING SEROLOGIC RH(D): CPT | Performed by: ORTHOPAEDIC SURGERY

## 2020-01-20 PROCEDURE — 25010000003 CEFAZOLIN IN DEXTROSE 2-4 GM/100ML-% SOLUTION: Performed by: NURSE PRACTITIONER

## 2020-01-20 PROCEDURE — 25010000002 VANCOMYCIN PER 500 MG: Performed by: ORTHOPAEDIC SURGERY

## 2020-01-20 PROCEDURE — 82962 GLUCOSE BLOOD TEST: CPT

## 2020-01-20 PROCEDURE — 27130 TOTAL HIP ARTHROPLASTY: CPT | Performed by: NURSE PRACTITIONER

## 2020-01-20 PROCEDURE — 86900 BLOOD TYPING SEROLOGIC ABO: CPT | Performed by: ORTHOPAEDIC SURGERY

## 2020-01-20 PROCEDURE — 0SRB06Z REPLACEMENT OF LEFT HIP JOINT WITH OXIDIZED ZIRCONIUM ON POLYETHYLENE SYNTHETIC SUBSTITUTE, OPEN APPROACH: ICD-10-PCS | Performed by: ORTHOPAEDIC SURGERY

## 2020-01-20 PROCEDURE — 27130 TOTAL HIP ARTHROPLASTY: CPT | Performed by: ORTHOPAEDIC SURGERY

## 2020-01-20 PROCEDURE — 25010000003 CEFAZOLIN IN DEXTROSE 2-4 GM/100ML-% SOLUTION: Performed by: ORTHOPAEDIC SURGERY

## 2020-01-20 PROCEDURE — 25010000002 MIDAZOLAM PER 1 MG: Performed by: ANESTHESIOLOGY

## 2020-01-20 DEVICE — POLARSTEM STANDARD NON-CEMENTED                                    WITH TI/HA 2
Type: IMPLANTABLE DEVICE | Site: ACETABULUM | Status: FUNCTIONAL
Brand: POLARSTEM

## 2020-01-20 DEVICE — R3 20 DEGREE XLPE ACETABULAR LINER                                    32MM INNER DIAMETER X OUTER DIAMETER 50MM
Type: IMPLANTABLE DEVICE | Site: ACETABULUM | Status: FUNCTIONAL
Brand: R3

## 2020-01-20 DEVICE — REFLECTION SPHERICAL HEAD SCREW 25MM
Type: IMPLANTABLE DEVICE | Site: ACETABULUM | Status: FUNCTIONAL
Brand: REFLECTION

## 2020-01-20 DEVICE — IMPLANTABLE DEVICE: Type: IMPLANTABLE DEVICE | Site: ACETABULUM | Status: FUNCTIONAL

## 2020-01-20 DEVICE — REFLECTION SPHERICAL HEAD SCREW 30MM
Type: IMPLANTABLE DEVICE | Site: ACETABULUM | Status: FUNCTIONAL
Brand: REFLECTION

## 2020-01-20 DEVICE — OXINIUM FEMORAL HEAD 12/14 TAPER                                    32MM -3
Type: IMPLANTABLE DEVICE | Site: ACETABULUM | Status: FUNCTIONAL
Brand: OXINIUM

## 2020-01-20 DEVICE — R3 3 HOLE ACETABULAR SHELL 50MM
Type: IMPLANTABLE DEVICE | Site: ACETABULUM | Status: FUNCTIONAL
Brand: R3 ACETABULAR

## 2020-01-20 RX ORDER — TRANEXAMIC ACID 100 MG/ML
INJECTION, SOLUTION INTRAVENOUS AS NEEDED
Status: DISCONTINUED | OUTPATIENT
Start: 2020-01-20 | End: 2020-01-20 | Stop reason: HOSPADM

## 2020-01-20 RX ORDER — PROPOFOL 10 MG/ML
VIAL (ML) INTRAVENOUS AS NEEDED
Status: DISCONTINUED | OUTPATIENT
Start: 2020-01-20 | End: 2020-01-20 | Stop reason: SURG

## 2020-01-20 RX ORDER — HYDROCHLOROTHIAZIDE 25 MG/1
25 TABLET ORAL DAILY
Status: DISCONTINUED | OUTPATIENT
Start: 2020-01-21 | End: 2020-01-22 | Stop reason: HOSPADM

## 2020-01-20 RX ORDER — MELOXICAM 15 MG/1
15 TABLET ORAL ONCE
Status: DISCONTINUED | OUTPATIENT
Start: 2020-01-20 | End: 2020-01-20 | Stop reason: HOSPADM

## 2020-01-20 RX ORDER — MAGNESIUM HYDROXIDE 1200 MG/15ML
LIQUID ORAL AS NEEDED
Status: DISCONTINUED | OUTPATIENT
Start: 2020-01-20 | End: 2020-01-20 | Stop reason: HOSPADM

## 2020-01-20 RX ORDER — SODIUM CHLORIDE 0.9 % (FLUSH) 0.9 %
3-10 SYRINGE (ML) INJECTION AS NEEDED
Status: DISCONTINUED | OUTPATIENT
Start: 2020-01-20 | End: 2020-01-20 | Stop reason: HOSPADM

## 2020-01-20 RX ORDER — INSULIN GLARGINE 100 [IU]/ML
19 INJECTION, SOLUTION SUBCUTANEOUS NIGHTLY PRN
Status: DISCONTINUED | OUTPATIENT
Start: 2020-01-20 | End: 2020-01-21

## 2020-01-20 RX ORDER — PROMETHAZINE HYDROCHLORIDE 25 MG/ML
6.25 INJECTION, SOLUTION INTRAMUSCULAR; INTRAVENOUS
Status: DISCONTINUED | OUTPATIENT
Start: 2020-01-20 | End: 2020-01-20 | Stop reason: HOSPADM

## 2020-01-20 RX ORDER — MIDAZOLAM HYDROCHLORIDE 1 MG/ML
2 INJECTION INTRAMUSCULAR; INTRAVENOUS
Status: DISCONTINUED | OUTPATIENT
Start: 2020-01-20 | End: 2020-01-20 | Stop reason: HOSPADM

## 2020-01-20 RX ORDER — FENTANYL CITRATE 50 UG/ML
50 INJECTION, SOLUTION INTRAMUSCULAR; INTRAVENOUS
Status: DISCONTINUED | OUTPATIENT
Start: 2020-01-20 | End: 2020-01-20 | Stop reason: HOSPADM

## 2020-01-20 RX ORDER — CEFAZOLIN SODIUM 2 G/100ML
2 INJECTION, SOLUTION INTRAVENOUS EVERY 8 HOURS
Status: COMPLETED | OUTPATIENT
Start: 2020-01-20 | End: 2020-01-21

## 2020-01-20 RX ORDER — HYDROCODONE BITARTRATE AND ACETAMINOPHEN 7.5; 325 MG/1; MG/1
1 TABLET ORAL ONCE AS NEEDED
Status: DISCONTINUED | OUTPATIENT
Start: 2020-01-20 | End: 2020-01-20 | Stop reason: HOSPADM

## 2020-01-20 RX ORDER — VANCOMYCIN HYDROCHLORIDE 1 G/200ML
15 INJECTION, SOLUTION INTRAVENOUS ONCE
Status: COMPLETED | OUTPATIENT
Start: 2020-01-20 | End: 2020-01-20

## 2020-01-20 RX ORDER — LABETALOL HYDROCHLORIDE 5 MG/ML
5 INJECTION, SOLUTION INTRAVENOUS
Status: DISCONTINUED | OUTPATIENT
Start: 2020-01-20 | End: 2020-01-20 | Stop reason: HOSPADM

## 2020-01-20 RX ORDER — LIDOCAINE HYDROCHLORIDE 20 MG/ML
INJECTION, SOLUTION INFILTRATION; PERINEURAL AS NEEDED
Status: DISCONTINUED | OUTPATIENT
Start: 2020-01-20 | End: 2020-01-20 | Stop reason: SURG

## 2020-01-20 RX ORDER — OXYCODONE AND ACETAMINOPHEN 7.5; 325 MG/1; MG/1
1 TABLET ORAL ONCE AS NEEDED
Status: DISCONTINUED | OUTPATIENT
Start: 2020-01-20 | End: 2020-01-20 | Stop reason: HOSPADM

## 2020-01-20 RX ORDER — UREA 10 %
3 LOTION (ML) TOPICAL NIGHTLY PRN
Status: DISCONTINUED | OUTPATIENT
Start: 2020-01-20 | End: 2020-01-22 | Stop reason: HOSPADM

## 2020-01-20 RX ORDER — FAMOTIDINE 10 MG/ML
20 INJECTION, SOLUTION INTRAVENOUS ONCE
Status: COMPLETED | OUTPATIENT
Start: 2020-01-20 | End: 2020-01-20

## 2020-01-20 RX ORDER — ACETAMINOPHEN 325 MG/1
650 TABLET ORAL ONCE AS NEEDED
Status: DISCONTINUED | OUTPATIENT
Start: 2020-01-20 | End: 2020-01-20 | Stop reason: HOSPADM

## 2020-01-20 RX ORDER — ONDANSETRON 4 MG/1
4 TABLET, FILM COATED ORAL EVERY 6 HOURS PRN
Status: DISCONTINUED | OUTPATIENT
Start: 2020-01-20 | End: 2020-01-22 | Stop reason: HOSPADM

## 2020-01-20 RX ORDER — DOCUSATE SODIUM 100 MG/1
100 CAPSULE, LIQUID FILLED ORAL 2 TIMES DAILY
Status: DISCONTINUED | OUTPATIENT
Start: 2020-01-20 | End: 2020-01-22 | Stop reason: HOSPADM

## 2020-01-20 RX ORDER — ONDANSETRON 2 MG/ML
4 INJECTION INTRAMUSCULAR; INTRAVENOUS ONCE AS NEEDED
Status: DISCONTINUED | OUTPATIENT
Start: 2020-01-20 | End: 2020-01-20 | Stop reason: HOSPADM

## 2020-01-20 RX ORDER — PANTOPRAZOLE SODIUM 40 MG/1
40 TABLET, DELAYED RELEASE ORAL EVERY MORNING
Status: DISCONTINUED | OUTPATIENT
Start: 2020-01-21 | End: 2020-01-22 | Stop reason: HOSPADM

## 2020-01-20 RX ORDER — CEFAZOLIN SODIUM 2 G/100ML
2 INJECTION, SOLUTION INTRAVENOUS ONCE
Status: COMPLETED | OUTPATIENT
Start: 2020-01-20 | End: 2020-01-20

## 2020-01-20 RX ORDER — ROCURONIUM BROMIDE 10 MG/ML
INJECTION, SOLUTION INTRAVENOUS AS NEEDED
Status: DISCONTINUED | OUTPATIENT
Start: 2020-01-20 | End: 2020-01-20 | Stop reason: SURG

## 2020-01-20 RX ORDER — PROMETHAZINE HYDROCHLORIDE 25 MG/ML
12.5 INJECTION, SOLUTION INTRAMUSCULAR; INTRAVENOUS ONCE AS NEEDED
Status: DISCONTINUED | OUTPATIENT
Start: 2020-01-20 | End: 2020-01-20 | Stop reason: HOSPADM

## 2020-01-20 RX ORDER — DEXAMETHASONE SODIUM PHOSPHATE 10 MG/ML
INJECTION INTRAMUSCULAR; INTRAVENOUS AS NEEDED
Status: DISCONTINUED | OUTPATIENT
Start: 2020-01-20 | End: 2020-01-20 | Stop reason: SURG

## 2020-01-20 RX ORDER — CARVEDILOL 3.12 MG/1
3.12 TABLET ORAL 2 TIMES DAILY WITH MEALS
Status: DISCONTINUED | OUTPATIENT
Start: 2020-01-20 | End: 2020-01-22 | Stop reason: HOSPADM

## 2020-01-20 RX ORDER — NALOXONE HCL 0.4 MG/ML
0.2 VIAL (ML) INJECTION AS NEEDED
Status: DISCONTINUED | OUTPATIENT
Start: 2020-01-20 | End: 2020-01-20 | Stop reason: HOSPADM

## 2020-01-20 RX ORDER — ONDANSETRON 2 MG/ML
INJECTION INTRAMUSCULAR; INTRAVENOUS AS NEEDED
Status: DISCONTINUED | OUTPATIENT
Start: 2020-01-20 | End: 2020-01-20 | Stop reason: SURG

## 2020-01-20 RX ORDER — PROMETHAZINE HYDROCHLORIDE 25 MG/1
25 SUPPOSITORY RECTAL ONCE AS NEEDED
Status: DISCONTINUED | OUTPATIENT
Start: 2020-01-20 | End: 2020-01-20 | Stop reason: HOSPADM

## 2020-01-20 RX ORDER — ONDANSETRON 2 MG/ML
4 INJECTION INTRAMUSCULAR; INTRAVENOUS EVERY 6 HOURS PRN
Status: DISCONTINUED | OUTPATIENT
Start: 2020-01-20 | End: 2020-01-22 | Stop reason: HOSPADM

## 2020-01-20 RX ORDER — EPHEDRINE SULFATE 50 MG/ML
5 INJECTION, SOLUTION INTRAVENOUS ONCE AS NEEDED
Status: DISCONTINUED | OUTPATIENT
Start: 2020-01-20 | End: 2020-01-20 | Stop reason: HOSPADM

## 2020-01-20 RX ORDER — LIDOCAINE HYDROCHLORIDE 10 MG/ML
0.5 INJECTION, SOLUTION EPIDURAL; INFILTRATION; INTRACAUDAL; PERINEURAL ONCE AS NEEDED
Status: DISCONTINUED | OUTPATIENT
Start: 2020-01-20 | End: 2020-01-20 | Stop reason: HOSPADM

## 2020-01-20 RX ORDER — LEVOTHYROXINE SODIUM 0.1 MG/1
100 TABLET ORAL NIGHTLY
Status: DISCONTINUED | OUTPATIENT
Start: 2020-01-20 | End: 2020-01-22 | Stop reason: HOSPADM

## 2020-01-20 RX ORDER — PROMETHAZINE HYDROCHLORIDE 25 MG/1
25 TABLET ORAL ONCE AS NEEDED
Status: DISCONTINUED | OUTPATIENT
Start: 2020-01-20 | End: 2020-01-20 | Stop reason: HOSPADM

## 2020-01-20 RX ORDER — MIDAZOLAM HYDROCHLORIDE 1 MG/ML
1 INJECTION INTRAMUSCULAR; INTRAVENOUS
Status: DISCONTINUED | OUTPATIENT
Start: 2020-01-20 | End: 2020-01-20 | Stop reason: HOSPADM

## 2020-01-20 RX ORDER — AMLODIPINE BESYLATE 5 MG/1
5 TABLET ORAL EVERY MORNING
Status: DISCONTINUED | OUTPATIENT
Start: 2020-01-21 | End: 2020-01-22 | Stop reason: HOSPADM

## 2020-01-20 RX ORDER — DIPHENHYDRAMINE HCL 25 MG
25 CAPSULE ORAL
Status: DISCONTINUED | OUTPATIENT
Start: 2020-01-20 | End: 2020-01-20 | Stop reason: HOSPADM

## 2020-01-20 RX ORDER — SODIUM CHLORIDE, SODIUM LACTATE, POTASSIUM CHLORIDE, CALCIUM CHLORIDE 600; 310; 30; 20 MG/100ML; MG/100ML; MG/100ML; MG/100ML
9 INJECTION, SOLUTION INTRAVENOUS CONTINUOUS
Status: DISCONTINUED | OUTPATIENT
Start: 2020-01-20 | End: 2020-01-20 | Stop reason: HOSPADM

## 2020-01-20 RX ORDER — ACETAMINOPHEN 10 MG/ML
1000 INJECTION, SOLUTION INTRAVENOUS ONCE
Status: COMPLETED | OUTPATIENT
Start: 2020-01-20 | End: 2020-01-20

## 2020-01-20 RX ORDER — PREGABALIN 75 MG/1
150 CAPSULE ORAL ONCE
Status: COMPLETED | OUTPATIENT
Start: 2020-01-20 | End: 2020-01-20

## 2020-01-20 RX ORDER — DIPHENHYDRAMINE HYDROCHLORIDE 50 MG/ML
12.5 INJECTION INTRAMUSCULAR; INTRAVENOUS
Status: DISCONTINUED | OUTPATIENT
Start: 2020-01-20 | End: 2020-01-20 | Stop reason: HOSPADM

## 2020-01-20 RX ORDER — HYDROCODONE BITARTRATE AND ACETAMINOPHEN 5; 325 MG/1; MG/1
1 TABLET ORAL EVERY 4 HOURS PRN
Status: DISCONTINUED | OUTPATIENT
Start: 2020-01-20 | End: 2020-01-21

## 2020-01-20 RX ORDER — HYDROMORPHONE HYDROCHLORIDE 1 MG/ML
0.5 INJECTION, SOLUTION INTRAMUSCULAR; INTRAVENOUS; SUBCUTANEOUS
Status: DISCONTINUED | OUTPATIENT
Start: 2020-01-20 | End: 2020-01-20 | Stop reason: HOSPADM

## 2020-01-20 RX ORDER — HYDRALAZINE HYDROCHLORIDE 20 MG/ML
5 INJECTION INTRAMUSCULAR; INTRAVENOUS
Status: DISCONTINUED | OUTPATIENT
Start: 2020-01-20 | End: 2020-01-20 | Stop reason: HOSPADM

## 2020-01-20 RX ORDER — SODIUM CHLORIDE 0.9 % (FLUSH) 0.9 %
3 SYRINGE (ML) INJECTION EVERY 12 HOURS SCHEDULED
Status: DISCONTINUED | OUTPATIENT
Start: 2020-01-20 | End: 2020-01-20 | Stop reason: HOSPADM

## 2020-01-20 RX ORDER — HYDROCODONE BITARTRATE AND ACETAMINOPHEN 5; 325 MG/1; MG/1
2 TABLET ORAL EVERY 4 HOURS PRN
Status: DISCONTINUED | OUTPATIENT
Start: 2020-01-20 | End: 2020-01-21

## 2020-01-20 RX ORDER — FLUMAZENIL 0.1 MG/ML
0.2 INJECTION INTRAVENOUS AS NEEDED
Status: DISCONTINUED | OUTPATIENT
Start: 2020-01-20 | End: 2020-01-20 | Stop reason: HOSPADM

## 2020-01-20 RX ADMIN — LIDOCAINE HYDROCHLORIDE 40 MG: 20 INJECTION, SOLUTION INFILTRATION; PERINEURAL at 15:27

## 2020-01-20 RX ADMIN — ACETAMINOPHEN 1000 MG: 10 INJECTION, SOLUTION INTRAVENOUS at 15:33

## 2020-01-20 RX ADMIN — POLYETHYLENE GLYCOL 3350 17 G: 17 POWDER, FOR SOLUTION ORAL at 22:55

## 2020-01-20 RX ADMIN — FAMOTIDINE 20 MG: 10 INJECTION INTRAVENOUS at 14:11

## 2020-01-20 RX ADMIN — ONDANSETRON HYDROCHLORIDE 4 MG: 2 SOLUTION INTRAMUSCULAR; INTRAVENOUS at 15:25

## 2020-01-20 RX ADMIN — DEXAMETHASONE SODIUM PHOSPHATE 8 MG: 10 INJECTION INTRAMUSCULAR; INTRAVENOUS at 15:42

## 2020-01-20 RX ADMIN — MIDAZOLAM 1 MG: 1 INJECTION INTRAMUSCULAR; INTRAVENOUS at 14:11

## 2020-01-20 RX ADMIN — DOCUSATE SODIUM 100 MG: 100 CAPSULE, LIQUID FILLED ORAL at 22:56

## 2020-01-20 RX ADMIN — FENTANYL CITRATE 50 MCG: 50 INJECTION INTRAMUSCULAR; INTRAVENOUS at 15:53

## 2020-01-20 RX ADMIN — PREGABALIN 150 MG: 75 CAPSULE ORAL at 13:57

## 2020-01-20 RX ADMIN — PHENYLEPHRINE HYDROCHLORIDE 100 MCG: 10 INJECTION INTRAVENOUS at 16:13

## 2020-01-20 RX ADMIN — SODIUM CHLORIDE, POTASSIUM CHLORIDE, SODIUM LACTATE AND CALCIUM CHLORIDE 9 ML/HR: 600; 310; 30; 20 INJECTION, SOLUTION INTRAVENOUS at 14:11

## 2020-01-20 RX ADMIN — ROCURONIUM BROMIDE 50 MG: 10 INJECTION INTRAVENOUS at 15:27

## 2020-01-20 RX ADMIN — MUPIROCIN 1 APPLICATION: 20 OINTMENT TOPICAL at 22:56

## 2020-01-20 RX ADMIN — PHENYLEPHRINE HYDROCHLORIDE 100 MCG: 10 INJECTION INTRAVENOUS at 15:40

## 2020-01-20 RX ADMIN — SODIUM CHLORIDE, POTASSIUM CHLORIDE, SODIUM LACTATE AND CALCIUM CHLORIDE: 600; 310; 30; 20 INJECTION, SOLUTION INTRAVENOUS at 17:28

## 2020-01-20 RX ADMIN — CEFAZOLIN SODIUM 2 G: 2 INJECTION, SOLUTION INTRAVENOUS at 15:38

## 2020-01-20 RX ADMIN — LEVOTHYROXINE SODIUM 100 MCG: 100 TABLET ORAL at 22:55

## 2020-01-20 RX ADMIN — FENTANYL CITRATE 50 MCG: 50 INJECTION, SOLUTION INTRAMUSCULAR; INTRAVENOUS at 18:02

## 2020-01-20 RX ADMIN — VANCOMYCIN HYDROCHLORIDE 1000 MG: 1 INJECTION, SOLUTION INTRAVENOUS at 13:59

## 2020-01-20 RX ADMIN — FENTANYL CITRATE 50 MCG: 50 INJECTION INTRAMUSCULAR; INTRAVENOUS at 15:34

## 2020-01-20 RX ADMIN — RIVAROXABAN 15 MG: 15 TABLET, FILM COATED ORAL at 22:55

## 2020-01-20 RX ADMIN — HYDROMORPHONE HYDROCHLORIDE 0.5 MG: 1 INJECTION, SOLUTION INTRAMUSCULAR; INTRAVENOUS; SUBCUTANEOUS at 18:11

## 2020-01-20 RX ADMIN — CARVEDILOL 3.12 MG: 3.12 TABLET, FILM COATED ORAL at 22:56

## 2020-01-20 RX ADMIN — PHENYLEPHRINE HYDROCHLORIDE 100 MCG: 10 INJECTION INTRAVENOUS at 16:47

## 2020-01-20 RX ADMIN — FENTANYL CITRATE 50 MCG: 50 INJECTION, SOLUTION INTRAMUSCULAR; INTRAVENOUS at 18:20

## 2020-01-20 RX ADMIN — CEFAZOLIN SODIUM 2 G: 2 INJECTION, SOLUTION INTRAVENOUS at 22:55

## 2020-01-20 RX ADMIN — ONDANSETRON HYDROCHLORIDE 4 MG: 4 TABLET, FILM COATED ORAL at 22:56

## 2020-01-20 RX ADMIN — SUGAMMADEX 200 MG: 100 INJECTION, SOLUTION INTRAVENOUS at 17:19

## 2020-01-20 RX ADMIN — PROPOFOL 150 MG: 10 INJECTION, EMULSION INTRAVENOUS at 15:27

## 2020-01-20 NOTE — PERIOPERATIVE NURSING NOTE
Notified dr. Roberts of pt's HR going from 30's to 120's , bigeminy , trigeminy.  No orders given.

## 2020-01-20 NOTE — ANESTHESIA POSTPROCEDURE EVALUATION
"Patient: Kyler Horne    Procedure Summary     Date:  01/20/20 Room / Location:  [facility] OR [address] [place] [address] MAIN OR    Anesthesia Start:  1513 Anesthesia Stop:  1733    Procedure:  TOTAL HIP ARTHROPLASTY (Left Hip) Diagnosis:       Primary osteoarthritis of left hip      (Primary osteoarthritis of left hip [M16.12])    Surgeon:  Del Dumont MD Provider:  Fina Fishman MD    Anesthesia Type:  general ASA Status:  4          Anesthesia Type: general    Vitals  Vitals Value Taken Time   /121 1/20/2020  5:45 PM   Temp 36.4 °C (97.6 °F) 1/20/2020  5:28 PM   Pulse 53 1/20/2020  6:13 PM   Resp 14 1/20/2020  5:28 PM   SpO2 95 % 1/20/2020  6:13 PM   Vitals shown include unvalidated device data.        Post Anesthesia Care and Evaluation    Patient location during evaluation: bedside  Patient participation: complete - patient participated  Level of consciousness: awake and alert  Pain management: adequate  Airway patency: patent  Anesthetic complications: No anesthetic complications    Cardiovascular status: acceptable  Respiratory status: acceptable  Hydration status: acceptable    Comments: BP (!) 139/121   Pulse 56   Temp 36.4 °C (97.6 °F) (Oral)   Resp 14   Ht 157.5 cm (62\")   Wt 69.4 kg (153 lb 1 oz)   SpO2 96%   BMI 28.00 kg/m²       "

## 2020-01-20 NOTE — ANESTHESIA PREPROCEDURE EVALUATION
Anesthesia Evaluation     Patient summary reviewed and Nursing notes reviewed                Airway   Mallampati: III  Dental      Pulmonary    (+) a smoker Former, sleep apnea on CPAP,   Cardiovascular     ECG reviewed  Rate: normal    (+) hypertension, past MI , dysrhythmias Paroxysmal Atrial Fib, PVC, hyperlipidemia,       Neuro/Psych- negative ROS  GI/Hepatic/Renal/Endo    (+)  GERD,  renal disease stones, diabetes mellitus using insulin,     Musculoskeletal     Abdominal    Substance History - negative use     OB/GYN negative ob/gyn ROS         Other   arthritis,                      Anesthesia Plan    ASA 4     general   (Sick sinus with kamari/tachy overlay and h/o PAF on coumadin (held for surgery)    Cardiac Echo report in chart supportive of allowing surgical intervention for a YOVANY on the left)  intravenous induction     Anesthetic plan, all risks, benefits, and alternatives have been provided, discussed and informed consent has been obtained with: patient.

## 2020-01-20 NOTE — OP NOTE
Name: Kyler Horne  YOB: 1940    DATE OF SURGERY: 1/20/2020    PREOPERATIVE DIAGNOSIS: Left hip end-stage osteoarthritis    POSTOPERATIVE DIAGNOSIS: Left hip end-stage osteoarthritis    PROCEDURE PERFORMED: Left  total hip replacement    SURGEON: Del Dumont M.D.    ASSISTANT: SUDHIR MARTINEZ    IMPLANTS:  Implant Name Type Inv. Item Serial No.  Lot No. LRB No. Used   LINER ACET R3 XLPE 20D [id number] - [id number] Implant LINER ACET R3 XLPE 20D [id number]  [employer] [id number] Left 1   SHLL ACET R3 3H STD 50MM - [id number] Implant SHLL ACET R3 3H STD 50MM  [employer] [id number] Left 1   SCRW SPH HD REFLECTION 6.5X30MM - [id number] Implant SCRW SPH HD REFLECTION 6.5X30MM  [employer] [id number] Left 1   SCRW SPH HD REFLECTION 6.5X25MM - [id number] Implant SCRW SPH HD REFLECTION 6.5X25MM  [employer] [id number] Left 1   STEM POLARSTEM CMTLESS STD CCD 135D SZ2 - [id number] Implant STEM POLARSTEM CMTLESS STD CCD 135D SZ2  [employer] [id number] Left 1   HD FEM OXINIUM TPR 12 14 32MM MIN3 - [id number] Implant HD FEM OXINIUM TPR 12 14 32MM MIN3  [employer] [id number] Left 1       Estimated Blood Loss: 200cc  Specimens : none  Complications: none    DESCRIPTION OF PROCEDURE:    The patient was taken to the operating room and placed in the supine position. A sequential compression device was carefully placed on the non-operative leg. Preoperative antibiotics were administered. Surgical time out was performed. After adequate induction of anesthesia the patient was then transferred onto the  table and positioned appropriately in the lateral decubitus position. The hip was then prepped and draped in the usual sterile fashion.    A posterior lateral surgical incision was then made.  The gluteus odalis fascia was then divided the gluteus odalis muscle was then bluntly dissected.  A Charnley self-retaining retractor was then placed.  A posterior capsulotomy was then  performed.  The superior limb was divided in line with the piriformis tendon.  The hip was then dislocated.  There were end-stage arthritic findings.  The femoral neck osteotomy was performed according to the level dictated by the template.  The acetabulum was exposed with standard retractors.  The labrum and pulvinar were then excised.  The cup was then medialized with the starting acetabular reamer to the medial wall.  I then progressively reamed up to the appropriate size and 45° of abduction and 20° of anteversion. Line to line reaming was performed. At this point the bone was nicely prepared there was excellent bleeding bone. We then impacted the acetabular component in 45 of abduction and 20 of anteversion the cup was stable.  Per routine we augmented the fixation with 2 screws in the posterior and superior quadrant  The final liner was then placed and it locked in nicely.  At this point we injected the hip with anesthetic cocktail solution.  We then turned our attention to the femur.   Standard retractors were placed to expose the femur.  The box osteotome was used to create a starting point.  The canal finder was then used to sound the canal.  The lateralizing reamer was then used to lateralize into the greater trochanter.  We progressively reamed and broached until the broach was very solid to axial and rotational stresses.  At this point we placed the trial neck and head hip was very stable to flexion and internal rotation as well as extension and external rotation.  The leg lengths were measured to be equal.  We then removed the trial components,copiously irrigated the hip, and then impacted the final stem.  At this point we then trialed and chose the final head. The head was placed on a clean dry taper.  The leg lengths were again measured to be equal.  At this point the hip was copiously irrigated with pulse lavage and the capsule was then reapproximated with #1 PDS suture, and the remainder of the hip  was closed in multiple layers in standard fashion..  There was excellent hemostasis. We placed a one-eighth inch Hemovac drain.  Sterile dressing were applied. At the end of the case, the sponge and needle counts were reported as being correct. There were no known complications. The patient was then transported to the recovery room.      Del Dumont M.D.  1/20/2020

## 2020-01-20 NOTE — ANESTHESIA PROCEDURE NOTES
Airway  Urgency: elective    Date/Time: 1/20/2020 3:29 PM  Airway not difficult    General Information and Staff    Patient location during procedure: OR  Anesthesiologist: Fina Fishman MD  CRNA: Carla Forbes CRNA    Indications and Patient Condition  Indications for airway management: airway protection    Preoxygenated: yes  Mask difficulty assessment: 1 - vent by mask    Final Airway Details  Final airway type: endotracheal airway      Successful airway: ETT  Cuffed: yes   Successful intubation technique: direct laryngoscopy  Facilitating devices/methods: intubating stylet  Endotracheal tube insertion site: oral  Blade: Weber  Blade size: 2  ETT size (mm): 7.0  Cormack-Lehane Classification: grade I - full view of glottis  Placement verified by: chest auscultation and capnometry   Cuff volume (mL): 7  Measured from: teeth  ETT/EBT  to teeth (cm): 20  Number of attempts at approach: 1  Assessment: lips, teeth, and gum same as pre-op and atraumatic intubation

## 2020-01-20 NOTE — PERIOPERATIVE NURSING NOTE
Notified Dr. Dumont of platelets level of 484 and WBC of 12.52 - both PAT LABS.  Pt had been treated for UTI with no symptoms currently.  No orders given.

## 2020-01-21 PROBLEM — R11.0 NAUSEA: Status: ACTIVE | Noted: 2020-01-21

## 2020-01-21 PROBLEM — E03.9 HYPOTHYROIDISM (ACQUIRED): Status: ACTIVE | Noted: 2020-01-21

## 2020-01-21 PROBLEM — I10 HTN (HYPERTENSION), BENIGN: Status: ACTIVE | Noted: 2020-01-21

## 2020-01-21 PROBLEM — G47.30 SLEEP APNEA: Status: ACTIVE | Noted: 2020-01-21

## 2020-01-21 PROBLEM — E11.9 TYPE 2 DIABETES MELLITUS (HCC): Status: ACTIVE | Noted: 2020-01-21

## 2020-01-21 LAB
GLUCOSE BLDC GLUCOMTR-MCNC: 180 MG/DL (ref 70–130)
GLUCOSE BLDC GLUCOMTR-MCNC: 182 MG/DL (ref 70–130)
GLUCOSE BLDC GLUCOMTR-MCNC: 186 MG/DL (ref 70–130)
HCT VFR BLD AUTO: 45.3 % (ref 34–46.6)
HGB BLD-MCNC: 14.9 G/DL (ref 12–15.9)

## 2020-01-21 PROCEDURE — 85014 HEMATOCRIT: CPT | Performed by: NURSE PRACTITIONER

## 2020-01-21 PROCEDURE — 63710000001 INSULIN GLARGINE PER 5 UNITS: Performed by: NURSE PRACTITIONER

## 2020-01-21 PROCEDURE — 97162 PT EVAL MOD COMPLEX 30 MIN: CPT | Performed by: PHYSICAL THERAPIST

## 2020-01-21 PROCEDURE — 25010000002 ONDANSETRON PER 1 MG: Performed by: NURSE PRACTITIONER

## 2020-01-21 PROCEDURE — 85018 HEMOGLOBIN: CPT | Performed by: NURSE PRACTITIONER

## 2020-01-21 PROCEDURE — 25010000003 CEFAZOLIN IN DEXTROSE 2-4 GM/100ML-% SOLUTION: Performed by: NURSE PRACTITIONER

## 2020-01-21 PROCEDURE — 82962 GLUCOSE BLOOD TEST: CPT

## 2020-01-21 PROCEDURE — 63710000001 INSULIN LISPRO (HUMAN) PER 5 UNITS: Performed by: NURSE PRACTITIONER

## 2020-01-21 PROCEDURE — 97110 THERAPEUTIC EXERCISES: CPT | Performed by: PHYSICAL THERAPIST

## 2020-01-21 RX ORDER — HYDROCODONE BITARTRATE AND ACETAMINOPHEN 5; 325 MG/1; MG/1
1 TABLET ORAL
Status: DISCONTINUED | OUTPATIENT
Start: 2020-01-21 | End: 2020-01-21

## 2020-01-21 RX ORDER — HYDROMORPHONE HYDROCHLORIDE 2 MG/1
2 TABLET ORAL EVERY 4 HOURS PRN
Status: DISCONTINUED | OUTPATIENT
Start: 2020-01-21 | End: 2020-01-22 | Stop reason: HOSPADM

## 2020-01-21 RX ORDER — DEXTROSE MONOHYDRATE 25 G/50ML
25 INJECTION, SOLUTION INTRAVENOUS
Status: DISCONTINUED | OUTPATIENT
Start: 2020-01-21 | End: 2020-01-22 | Stop reason: HOSPADM

## 2020-01-21 RX ORDER — INSULIN GLARGINE 100 [IU]/ML
19 INJECTION, SOLUTION SUBCUTANEOUS NIGHTLY
Status: DISCONTINUED | OUTPATIENT
Start: 2020-01-21 | End: 2020-01-22 | Stop reason: HOSPADM

## 2020-01-21 RX ORDER — BUSPIRONE HYDROCHLORIDE 5 MG/1
5 TABLET ORAL 3 TIMES DAILY PRN
COMMUNITY

## 2020-01-21 RX ORDER — NICOTINE POLACRILEX 4 MG
15 LOZENGE BUCCAL
Status: DISCONTINUED | OUTPATIENT
Start: 2020-01-21 | End: 2020-01-22 | Stop reason: HOSPADM

## 2020-01-21 RX ADMIN — DOCUSATE SODIUM 100 MG: 100 CAPSULE, LIQUID FILLED ORAL at 08:29

## 2020-01-21 RX ADMIN — PANTOPRAZOLE SODIUM 40 MG: 40 TABLET, DELAYED RELEASE ORAL at 06:36

## 2020-01-21 RX ADMIN — CEFAZOLIN SODIUM 2 G: 2 INJECTION, SOLUTION INTRAVENOUS at 06:32

## 2020-01-21 RX ADMIN — HYDROCODONE BITARTRATE AND ACETAMINOPHEN 1 TABLET: 5; 325 TABLET ORAL at 00:11

## 2020-01-21 RX ADMIN — AMLODIPINE BESYLATE 5 MG: 5 TABLET ORAL at 08:30

## 2020-01-21 RX ADMIN — ONDANSETRON 4 MG: 2 INJECTION INTRAMUSCULAR; INTRAVENOUS at 14:12

## 2020-01-21 RX ADMIN — HYDROCODONE BITARTRATE AND ACETAMINOPHEN 1 TABLET: 5; 325 TABLET ORAL at 04:09

## 2020-01-21 RX ADMIN — HYDROCODONE BITARTRATE AND ACETAMINOPHEN 1 TABLET: 5; 325 TABLET ORAL at 04:38

## 2020-01-21 RX ADMIN — LEVOTHYROXINE SODIUM 100 MCG: 100 TABLET ORAL at 20:20

## 2020-01-21 RX ADMIN — RIVAROXABAN 15 MG: 15 TABLET, FILM COATED ORAL at 17:33

## 2020-01-21 RX ADMIN — HYDROMORPHONE HYDROCHLORIDE 2 MG: 2 TABLET ORAL at 20:46

## 2020-01-21 RX ADMIN — HYDROCHLOROTHIAZIDE 25 MG: 25 TABLET ORAL at 08:30

## 2020-01-21 RX ADMIN — INSULIN GLARGINE 19 UNITS: 100 INJECTION, SOLUTION SUBCUTANEOUS at 20:47

## 2020-01-21 RX ADMIN — HYDROCODONE BITARTRATE AND ACETAMINOPHEN 1 TABLET: 5; 325 TABLET ORAL at 13:06

## 2020-01-21 RX ADMIN — HYDROCODONE BITARTRATE AND ACETAMINOPHEN 1 TABLET: 5; 325 TABLET ORAL at 08:35

## 2020-01-21 RX ADMIN — POLYETHYLENE GLYCOL 3350 17 G: 17 POWDER, FOR SOLUTION ORAL at 08:30

## 2020-01-21 RX ADMIN — INSULIN LISPRO 2 UNITS: 100 INJECTION, SOLUTION INTRAVENOUS; SUBCUTANEOUS at 20:47

## 2020-01-21 RX ADMIN — CARVEDILOL 3.12 MG: 3.12 TABLET, FILM COATED ORAL at 08:30

## 2020-01-21 NOTE — DISCHARGE PLACEMENT REQUEST
"Lon Sanford (80 y.o. Female)     Date of Birth Social Security Number Address Home Phone MRN    1940  [id number] [facility] [id number] [phone] [id number]    Sabianist Marital Status          None Single       Admission Date Admission Type Admitting Provider Attending Provider Department, Room/Bed    1/20/20 Elective Del Dumont MD Brown, Reid B, MD [address], P784/1    Discharge Date Discharge Disposition Discharge Destination                       Attending Provider:  Del Dumont MD    Allergies:  Ciprofloxacin, Clavulanic Acid, Hydrocodone    Isolation:  None   Infection:  None   Code Status:  CPR    Ht:  157.5 cm (62\")   Wt:  69.4 kg (153 lb 1 oz)    Admission Cmt:  None   Principal Problem:  Primary osteoarthritis of left hip [M16.12] More...                 Active Insurance as of 1/20/2020     Primary Coverage     Payor Plan Insurance Group Employer/Plan Group    MEDICARE MEDICARE A & B      Payor Plan Address Payor Plan Phone Number Payor Plan Fax Number Effective Dates    [address] [phone]  1/1/2005 - None Entered    [facility] [address]       Subscriber Name Subscriber Birth Date Member ID       LON SANFORD 1940 [id number]           Secondary Coverage     Payor Plan Insurance Group Employer/Plan Group    [facility] SUPP [id number]     Payor Plan Address Payor Plan Phone Number Payor Plan Fax Number Effective Dates    [address]   4/1/2008 - None Entered    [facility] [id number]       Subscriber Name Subscriber Birth Date Member ID       LON SANFORD 1940 [id number]                 Emergency Contacts      (Rel.) Home Phone Work Phone Mobile Phone    Ronn Sanford (Son) [phone] -- --    Georgie Sanford (Daughter) -- -- [phone]              "

## 2020-01-21 NOTE — PROGRESS NOTES
Clinical Pharmacy Services: Medication History    Kyler Horne is a 80 y.o. female presenting to [facility] for Primary osteoarthritis of left hip [M16.12]  OA (osteoarthritis) of hip [M16.9]    She  has a past medical history of Diabetes mellitus (CMS/HCC), Diarrhea, Difficulty sleeping, Easy bruising, GERD (gastroesophageal reflux disease), History of heart attack (2011, 2013), History of kidney stones, History of skin cancer, Hyperlipidemia, Hypertension, Hypothyroidism, Joint pain, On anticoagulant therapy, Osteoarthritis, Paroxysmal atrial fibrillation (CMS/HCC), Ringing in ear, and Sleep apnea.    Allergies as of 01/08/2020 - Reviewed 10/30/2019   Allergen Reaction Noted   • Ciprofloxacin Diarrhea 04/27/2018   • Clavulanic acid Itching 02/02/2018   • Hydrocodone GI Intolerance 04/12/2018     Medication information was obtained from: Patient and Pharmacy   Pharmacy and Phone Number: [facility] [phone]    Prior to Admission Medications     Prescriptions Last Dose Informant Patient Reported? Taking?    Acetaminophen (TYLENOL EXTRA STRENGTH PO) 1/19/2020 Self Yes No    Take 1,000 mg by mouth As Needed.    amLODIPine (NORVASC) 5 MG tablet 1/20/2020 Self Yes Yes    Take 5 mg by mouth Every Morning.    BIOTIN PO 1/13/2020  Yes No    Take 1 tablet by mouth Daily.    busPIRone (BUSPAR) 5 MG tablet  Self Yes Yes    Take 5 mg by mouth 3 (Three) Times a Day As Needed.    carvedilol (COREG) 3.125 MG tablet 1/20/2020 Self Yes Yes    Take 3.125 mg by mouth 2 (two) times a day with meals.    Chlorhexidine Gluconate Cloth 2 % pads 1/20/2020  Yes Yes    Apply  topically. PRIOR TO OR    colesevelam (WELCHOL) 625 MG tablet 1/19/2020 Self Yes Yes    Take 625 mg by mouth Daily.    Cyanocobalamin (VITAMIN B 12 PO) 1/13/2020 Self Yes No    Take 3,000 mcg by mouth Daily. HELD FOR OR    FIBER SELECT GUMMIES PO 1/19/2020  Yes Yes    Take 5 g by mouth As Needed.    hydroCHLOROthiazide (HYDRODIURIL) 25 MG tablet  "1/19/2020  Yes Yes    Take 25 mg by mouth Daily.    insulin glargine (LANTUS) 100 UNIT/ML injection 1/19/2020 Self Yes Yes    Inject 19 Units under the skin into the appropriate area as directed Every Night.    [employer] LANCETS THIN 26G misc 1/19/2020  Yes Yes    USE THREE TIMES A DAY TO TEST BLOOD SUGAR    levothyroxine (SYNTHROID, LEVOTHROID) 100 MCG tablet 1/19/2020 Self Yes Yes    Take 100 mcg by mouth Every Night.    Multiple Vitamins-Minerals (ADULT ONE DAILY GUMMIES PO) 1/13/2020 Self Yes No    Take 2 tablets by mouth Daily. HELD FOR OR    mupirocin (BACTROBAN) 2 % nasal ointment 1/20/2020  Yes Yes    into the nostril(s) as directed by provider 2 (Two) Times a Day. PRIOR TO OR    nitrofurantoin, macrocrystal-monohydrate, (MACROBID) 100 MG capsule 1/19/2020  No Yes    Take 1 capsule by mouth 2 (Two) Times a Day.    pantoprazole (PROTONIX) 40 MG EC tablet 1/20/2020  Yes Yes    Take 40 mg by mouth Every Morning.    pravastatin (PRAVACHOL) 40 MG tablet 1/19/2020 Self Yes Yes    Take 40 mg by mouth Every Night.    Probiotic Product (PROBIOTIC DAILY PO) 1/13/2020 Self Yes No    Take 1 tablet by mouth Daily.    rivaroxaban (XARELTO) 15 MG tablet 1/16/2020  Yes No    Take 15 mg by mouth Every Evening. PT TO HOLD 3 DAYS PRIOR TO SURGERY    vitamin d (CHOLECALIFEROL) 5000 units capsule 1/13/2020 Self Yes No    Take 5,000 Units by mouth Daily.        Medication notes:   1) Added Buspar to med rec based on pharmacy records. Patient reports she recently started taking this medication prior to surgery.   2) Confirmed all listed OTC meds with patient.   3) Previous Lantus instructions in med rec stated \"SLIDING SCALE\", however, patient and pharmacy both state she takes Lantus 19 units SQ nightly with no instructions for holding or adjusting the Lantus dose based on blood glucose. Patient and pharmacy report no other diabetes medications.     This medication list is complete to the best of my knowledge as of 1/21/2020   "   Please call if questions.    Ramon Salinas PharmD, ZACHARY, BCPS  01/21/20 2:39 PM

## 2020-01-21 NOTE — SIGNIFICANT NOTE
01/21/20 1702   Rehab Treatment   Discipline physical therapist   Reason Treatment Not Performed patient/family declined treatment  (Pt missed the gym this afternoon secondary to nausea and vomiting. Attempted to follow up this evening, but pt was eating dinner. Will resume the gym in the AM.)   Recommendation   PT - Next Appointment 01/22/20

## 2020-01-21 NOTE — PLAN OF CARE
80/F POD#1 left YOVANY.  ALOx4, RA, lungs clear, BS hypoactive, voiding independently, BM today.  Up x1 BRP with walker/gait belt.  2+ pedal pulses noted bilaterally, no c/o numbness/tingling in operative extremity, dressing CDI.  Pain well-controlled with PO pain meds only.  PIV saline locked.  Plans to D/C home with HH tomorrow if cleared.        Problem: Patient Care Overview  Goal: Plan of Care Review  Outcome: Ongoing (interventions implemented as appropriate)  Flowsheets (Taken 1/21/2020 3576)  Progress: improving  Plan of Care Reviewed With: patient  Outcome Summary: 80/F POD#1 left YOVANY.  ALOx4, RA, lungs clear, BS hypoactive, voiding independently, BM today.  Up x1 BRP with walker/gait belt.  2+ pedal pulses noted bilaterally, no c/o numbness/tingling in operative extremity, dressing CDI.  Pain well-controlled with PO pain meds only.  PIV saline locked.  Plans to D/C home with HH tomorrow if cleared.

## 2020-01-21 NOTE — CONSULTS
Patient Name:  Kyler Horne  YOB: 1940  MRN:  [id number]  Date of Admission:  1/20/2020  Date of Consult:  1/21/2020  Patient Care Team:  Trang Tucker MD as PCP - General (Internal Medicine)  Pilar Lynn MD as Consulting Physician (Endocrinology)  Jennyfer Fishman MD as Consulting Physician (Cardiology)    Inpatient Hospitalist Consult  Consult performed by: Ro Dalton APRN  Consult ordered by: Del Dumont MD  Reason for consult: Evaluate status and make recommendations regarding treatment for diabetes           Subjective   History of Present Illness  Ms. Horne is a 80 y.o. female that has been admitted to [facility] following elective TOTAL HIP ARTHROPLASTY.  She has been admitted to an orthopedic floor following surgery and we were asked to see and assist with her medical problems, specifically relating to her diabetes.  At the time of my visit she denies any chest pain, SOA, or diarrhea.  She has not tolerated much po.  She does complain of expected postoperative discomfort and nausea w/dizziness.  She reports being in a normal state of health leading up to surgery. She is concerned that prescribed pain medication is causing the nausea; she has tolerated po dilaudid w/prev ortho surgeries. In regards to DM, she is prescribed Lantus 19 units nightly at home. Last A1C was 6.5 9/11/19. She denies complications from DM.       Past Medical History:   Diagnosis Date   • Diabetes mellitus (CMS/HCC)    • Diarrhea    • Difficulty sleeping    • Easy bruising    • GERD (gastroesophageal reflux disease)    • History of heart attack 2011, 2013   • History of kidney stones    • History of skin cancer    • Hyperlipidemia    • Hypertension    • Hypothyroidism    • Joint pain    • On anticoagulant therapy    • Osteoarthritis    • Paroxysmal atrial fibrillation (CMS/HCC)    • Ringing in ear    • Sleep apnea     CPAP     Past Surgical History:   Procedure Laterality  Date   • CHOLECYSTECTOMY     • COLON RESECTION  2011   • DILATATION AND CURETTAGE     • EXTRACORPOREAL SHOCK WAVE LITHOTRIPSY (ESWL)     • SKIN CANCER EXCISION     • TONSILLECTOMY     • TOTAL HIP ARTHROPLASTY Right 4/27/2018    Procedure: TOTAL HIP ARTHROPLASTY;  Surgeon: Del Dumont MD;  Location: [facility] OR;  Service: Orthopedics   • TOTAL HIP ARTHROPLASTY Left 1/20/2020    Procedure: TOTAL HIP ARTHROPLASTY;  Surgeon: Del Dumont MD;  Location: [facility] OR;  Service: Orthopedics     Family History   Problem Relation Age of Onset   • Breast cancer Mother    • Hypertension Mother    • Diabetes Mother    • Heart disease Father    • Diabetes Father    • Stroke Sister    • Lung cancer Sister    • Lung disease Sister    • Diabetes Sister    • Malig Hyperthermia Neg Hx      Social History     Tobacco Use   • Smoking status: Former Smoker     Packs/day: 0.25     Years: 12.00     Pack years: 3.00     Types: Cigarettes   • Smokeless tobacco: Never Used   • Tobacco comment: QUIT OVER 50 YRS AGO   Substance Use Topics   • Alcohol use: No   • Drug use: No     Medications Prior to Admission   Medication Sig Dispense Refill Last Dose   • amLODIPine (NORVASC) 5 MG tablet Take 5 mg by mouth Every Morning.   1/20/2020 at Unknown time   • busPIRone (BUSPAR) 5 MG tablet Take 5 mg by mouth 3 (Three) Times a Day As Needed.      • carvedilol (COREG) 3.125 MG tablet Take 3.125 mg by mouth 2 (two) times a day with meals.   1/20/2020 at 1000   • Chlorhexidine Gluconate Cloth 2 % pads Apply  topically. PRIOR TO OR   1/20/2020 at 1000   • colesevelam (WELCHOL) 625 MG tablet Take 625 mg by mouth Daily.   1/19/2020 at Unknown time   • FIBER SELECT GUMMIES PO Take 5 g by mouth As Needed.   1/19/2020 at Unknown time   • hydroCHLOROthiazide (HYDRODIURIL) 25 MG tablet Take 25 mg by mouth Daily.   1/19/2020 at Unknown time   • insulin glargine (LANTUS) 100 UNIT/ML injection Inject 19 Units under the skin into the appropriate area as  directed Every Night.   1/19/2020 at Unknown time   • [employer] LANCETS THIN 26G misc USE THREE TIMES A DAY TO TEST BLOOD SUGAR   1/19/2020 at Unknown time   • levothyroxine (SYNTHROID, LEVOTHROID) 100 MCG tablet Take 100 mcg by mouth Every Night.   1/19/2020 at Unknown time   • mupirocin (BACTROBAN) 2 % nasal ointment into the nostril(s) as directed by provider 2 (Two) Times a Day. PRIOR TO OR   1/20/2020 at 1000   • nitrofurantoin, macrocrystal-monohydrate, (MACROBID) 100 MG capsule Take 1 capsule by mouth 2 (Two) Times a Day. (Patient taking differently: Take 100 mg by mouth 2 (Two) Times a Day. x7 days per pharmacy) 14 capsule 0 1/19/2020 at Unknown time   • pantoprazole (PROTONIX) 40 MG EC tablet Take 40 mg by mouth Every Morning.   1/20/2020 at Unknown time   • pravastatin (PRAVACHOL) 40 MG tablet Take 40 mg by mouth Every Night.   1/19/2020 at Unknown time   • Acetaminophen (TYLENOL EXTRA STRENGTH PO) Take 1,000 mg by mouth As Needed.   1/19/2020   • BIOTIN PO Take 1 tablet by mouth Daily.   1/13/2020   • Cyanocobalamin (VITAMIN B 12 PO) Take 3,000 mcg by mouth Daily. HELD FOR OR   1/13/2020   • Multiple Vitamins-Minerals (ADULT ONE DAILY GUMMIES PO) Take 2 tablets by mouth Daily. HELD FOR OR   1/13/2020   • Probiotic Product (PROBIOTIC DAILY PO) Take 1 tablet by mouth Daily.   1/13/2020   • rivaroxaban (XARELTO) 15 MG tablet Take 15 mg by mouth Every Evening. PT TO HOLD 3 DAYS PRIOR TO SURGERY   1/16/2020   • vitamin d (CHOLECALIFEROL) 5000 units capsule Take 5,000 Units by mouth Daily.   1/13/2020     Allergies:  Ciprofloxacin; Clavulanic acid; and Hydrocodone    Review of Systems   Constitutional: Positive for appetite change. Negative for fever.   HENT: Negative.    Respiratory: Negative for cough and shortness of breath.    Cardiovascular: Negative for chest pain, palpitations and leg swelling.   Gastrointestinal: Positive for nausea. Negative for abdominal distention, abdominal pain, constipation, diarrhea  and vomiting.   Genitourinary: Negative for difficulty urinating and dysuria.   Musculoskeletal:        Post op pain   Skin: Negative for rash.   Neurological: Positive for dizziness. Negative for headaches.   Psychiatric/Behavioral: Negative.        Objective      Vital Signs  Temp:  [96.5 °F (35.8 °C)-97.8 °F (36.6 °C)] 97.7 °F (36.5 °C)  Heart Rate:  [] 59  Resp:  [14-18] 16  BP: (112-161)/() 142/62  Body mass index is 28 kg/m².    Physical Exam   Constitutional: She is oriented to person, place, and time. She appears well-developed and well-nourished. No distress.   HENT:   Head: Normocephalic and atraumatic.   Eyes: EOM are normal.   Neck: No JVD present.   Cardiovascular: Normal rate and regular rhythm.   Pulmonary/Chest: Effort normal and breath sounds normal. No respiratory distress.   Abdominal: Soft. Bowel sounds are normal. She exhibits no distension. There is no tenderness.   Musculoskeletal: She exhibits no edema.   Neurological: She is alert and oriented to person, place, and time.   Skin: Skin is warm and dry.   Psychiatric: She has a normal mood and affect.   Vitals reviewed.      Results Review:   I reviewed the patient's new clinical results.  I reviewed the patient's new imaging results and agree with the interpretation.  I reviewed the patient's other test results and agree with the interpretation  I personally viewed and interpreted the patient's EKG/Telemetry data         Assessment/Plan     Active Hospital Problems    Diagnosis POA   • **Primary osteoarthritis of left hip [M16.12] Unknown     Added automatically from request for surgery [id number]     • Type 2 diabetes mellitus (CMS/HCC) [E11.9] Yes   • Sleep apnea [G47.30] Yes   • HTN (hypertension), benign [I10] Yes   • Hypothyroidism (acquired) [E03.9] Yes   • Nausea [R11.0] Unknown   • OA (osteoarthritis) of hip [M16.9] Yes       Ms. Horne is a 80 y.o. female who is s/p TOTAL HIP ARTHROPLASTY.    · She seems to be doing well thus  far postoperatively.   · Continue her long acting insulin - LANTUS 19 units SC nightly.  · HUMALOG - moderate dose correctional factor as needed.  · Monitor glucose QAC and QHS. For any BG less than 70 mg/dL, treat per hospital protocol  · HgbA1C and fasting BMP ordered for in the morning.  Will review.    · NCS diet  · Monitor BP; avoid hypotension  · Cpap w/sleep for sleep apnea   · Xarelto has been ordered for DVT prophylaxis per surgery.  · She was encouraged to use incentive spirometer as instructed.  · Nursing to notify surgeon of intolerance to hydrocodone; has tolerated po hydromorphone in past w/previous ortho surgeries      Thank you very much for asking [facility] to be involved in this patient's care. We will follow along with you.      SARHA Nieves  [facility]  01/21/20  3:23 PM

## 2020-01-21 NOTE — PLAN OF CARE
Problem: Patient Care Overview  Goal: Plan of Care Review  Flowsheets (Taken 1/21/2020 1311)  Outcome Summary: Pt is s/p L YOVANY and presents with pain, limited ROM and antalgic gait. Pt ambualted 40 ft  CGA with RWx. Pt refused further distance secondary to feeling weak. Pt would benefit from PT to address her impairments and work towards the outlined goals.

## 2020-01-21 NOTE — THERAPY EVALUATION
Patient Name: Kyler Horne  : 1940    MRN: [id number]                              Today's Date: 2020       Admit Date: 2020    Visit Dx:     ICD-10-CM ICD-9-CM   1. Primary osteoarthritis of left hip M16.12 715.15     Patient Active Problem List   Diagnosis   • Primary osteoarthritis of right hip   • OA (osteoarthritis) of hip   • Primary osteoarthritis of left hip     Past Medical History:   Diagnosis Date   • Diabetes mellitus (CMS/HCC)    • Diarrhea    • Difficulty sleeping    • Easy bruising    • GERD (gastroesophageal reflux disease)    • History of heart attack ,    • History of kidney stones    • History of skin cancer    • Hyperlipidemia    • Hypertension    • Hypothyroidism    • Joint pain    • On anticoagulant therapy    • Osteoarthritis    • Paroxysmal atrial fibrillation (CMS/HCC)    • Ringing in ear    • Sleep apnea     CPAP     Past Surgical History:   Procedure Laterality Date   • CHOLECYSTECTOMY     • COLON RESECTION     • DILATATION AND CURETTAGE     • EXTRACORPOREAL SHOCK WAVE LITHOTRIPSY (ESWL)     • SKIN CANCER EXCISION     • TONSILLECTOMY     • TOTAL HIP ARTHROPLASTY Right 2018    Procedure: TOTAL HIP ARTHROPLASTY;  Surgeon: Del Dumont MD;  Location: [facility];  Service: Orthopedics   • TOTAL HIP ARTHROPLASTY Left 2020    Procedure: TOTAL HIP ARTHROPLASTY;  Surgeon: Del Dumont MD;  Location: [facility];  Service: Orthopedics     General Information     Row Name 20 1128          PT Evaluation Time/Intention    Document Type  evaluation  -     Mode of Treatment  individual therapy;physical therapy  -     Row Name 20 1128          General Information    Prior Level of Function  independent:  -     Existing Precautions/Restrictions  fall;hip  -     Barriers to Rehab  previous functional deficit  -     Row Name 20 1128          Relationship/Environment    Lives With  alone  -     Row Name 20 1128           Resource/Environmental Concerns    Current Living Arrangements  home/apartment/condo  -     Row Name 01/21/20 1128          Home Main Entrance    Number of Stairs, Main Entrance  four  -     Stair Railings, Main Entrance  railings safe and in good condition  -     Row Name 01/21/20 1128          Cognitive Assessment/Intervention- PT/OT    Orientation Status (Cognition)  oriented x 3  -     Row Name 01/21/20 1128          Safety Issues, Functional Mobility    Impairments Affecting Function (Mobility)  endurance/activity tolerance;range of motion (ROM);pain  -       User Key  (r) = Recorded By, (t) = Taken By, (c) = Cosigned By    Initials Name Provider Type    Jordana Lyons PT Physical Therapist        Mobility     Row Name 01/21/20 1309          Bed Mobility Assessment/Treatment    Comment (Bed Mobility)  UIC  -     Row Name 01/21/20 1309          Sit-Stand Transfer    Sit-Stand [place] (Transfers)  minimum assist (75% patient effort)  -     Assistive Device (Sit-Stand Transfers)  walker, front-wheeled  -     Row Name 01/21/20 1309          Gait/Stairs Assessment/Training    Gait/Stairs Assessment/Training  gait/ambulation independence  -     [place] Level (Gait)  contact guard  -     Assistive Device (Gait)  walker, front-wheeled  -     Distance in Feet (Gait)  40  -     Deviations/Abnormal Patterns (Gait)  antalgic;gait speed decreased  -     Comment (Gait/Stairs)  distance limited by pt feeling weak  -       User Key  (r) = Recorded By, (t) = Taken By, (c) = Cosigned By    Initials Name Provider Type    Jodrana Lyons PT Physical Therapist        Obj/Interventions     Row Name 01/21/20 1128          General ROM    GENERAL ROM COMMENTS  WFL except L hip  -     Row Name 01/21/20 1128          MMT (Manual Muscle Testing)    General MMT Comments  BLE WFL  -     Row Name 01/21/20 1128          Therapeutic Exercise    Comment (Therapeutic Exercise)  L  YOVANY protocol x 10 reps  -       User Key  (r) = Recorded By, (t) = Taken By, (c) = Cosigned By    Initials Name Provider Type    Jordana Lyons, PT Physical Therapist        Goals/Plan     Row Name 01/21/20 1130          Bed Mobility Goal 1 (PT)    Activity/Assistive Device (Bed Mobility Goal 1, PT)  bed mobility activities, all  -KH     [place] Level/Cues Needed (Bed Mobility Goal 1, PT)  independent  -KH     Time Frame (Bed Mobility Goal 1, PT)  3 days  -     Row Name 01/21/20 1130          Transfer Goal 1 (PT)    Activity/Assistive Device (Transfer Goal 1, PT)  transfers, all;walker, rolling  -KH     [place] Level/Cues Needed (Transfer Goal 1, PT)  supervision required  -KH     Time Frame (Transfer Goal 1, PT)  3 days  -     Row Name 01/21/20 1130          Gait Training Goal 1 (PT)    Activity/Assistive Device (Gait Training Goal 1, PT)  gait (walking locomotion);walker, rolling  -KH     [place] Level (Gait Training Goal 1, PT)  supervision required  -KH     Time Frame (Gait Training Goal 1, PT)  3 days  -     Row Name 01/21/20 1130          Stairs Goal 1 (PT)    Activity/Assistive Device (Stairs Goal 1, PT)  stairs, all skills  -KH     [place] Level/Cues Needed (Stairs Goal 1, PT)  contact guard assist  -KH     Number of Stairs (Stairs Goal 1, PT)  4  -KH     Time Frame (Stairs Goal 1, PT)  3 days  -     Row Name 01/21/20 1130          Patient Education Goal (PT)    Activity (Patient Education Goal, PT)  HEP  -KH     [place]/Cues/Accuracy (Memory Goal 2, PT)  demonstrates adequately  -KH     Time Frame (Patient Education Goal, PT)  3 days  -       User Key  (r) = Recorded By, (t) = Taken By, (c) = Cosigned By    Initials Name Provider Type    Jordana Lyons, PT Physical Therapist        Clinical Impression     Row Name 01/21/20 1129          Pain Assessment    Additional Documentation  Pain Scale: Numbers Pre/Post-Treatment (Group)  -     Row Name  01/21/20 1129          Pain Scale: Numbers Pre/Post-Treatment    Pain Scale: Numbers, Pretreatment  8/10  -     Pain Scale: Numbers, Post-Treatment  8/10  -     Pain Location - Side  Left  -     Pain Location  hip  -     Pain Intervention(s)  Repositioned;Cold applied;Ambulation/increased activity  -     Row Name 01/21/20 1129          Plan of Care Review    Plan of Care Reviewed With  patient  -     Row Name 01/21/20 1129          Physical Therapy Clinical Impression    Patient/Family Goals Statement (PT Clinical Impression)  return home to [facility]  -     Criteria for Skilled Interventions Met (PT Clinical Impression)  yes  -     Rehab Potential (PT Clinical Summary)  good, to achieve stated therapy goals  -     Row Name 01/21/20 1129          Positioning and Restraints    Pre-Treatment Position  sitting in chair/recliner  -     Post Treatment Position  chair  -     In Chair  reclined;call light within reach;encouraged to call for assist;notified nsg;exit alarm on  -       User Key  (r) = Recorded By, (t) = Taken By, (c) = Cosigned By    Initials Name Provider Type    Jordana Lyons, PT Physical Therapist        Outcome Measures     Row Name 01/21/20 1131          How much help from another person do you currently need...    Turning from your back to your side while in flat bed without using bedrails?  3  -KH     Moving from lying on back to sitting on the side of a flat bed without bedrails?  3  -KH     Moving to and from a bed to a chair (including a wheelchair)?  3  -KH     Standing up from a chair using your arms (e.g., wheelchair, bedside chair)?  3  -KH     Climbing 3-5 steps with a railing?  2  -KH     To walk in hospital room?  3  -KH     AM-PAC 6 Clicks Score (PT)  17  -     Row Name 01/21/20 1131          Functional Assessment    Outcome Measure Options  AM-PAC 6 Clicks Basic Mobility (PT)  -       User Key  (r) = Recorded By, (t) = Taken By, (c) = Cosigned By     Initials Name Provider Type    Jordana Lyons PT Physical Therapist          PT Recommendation and Plan  Planned Therapy Interventions (PT Eval): bed mobility training, gait training, home exercise program, patient/family education, strengthening, transfer training, ROM (range of motion)  Outcome Summary/Treatment Plan (PT)  Anticipated Discharge Disposition (PT): home with home health  Plan of Care Reviewed With: patient  Outcome Summary: Pt is s/p L YOVANY and presents with pain, limited ROM and antalgic gait. Pt ambualted 40 ft  CGA with RWx. Pt refused further distance secondary to feeling weak. Pt would benefit from PT to address her impairments and work towards the outlined goals.     Time Calculation:   PT Charges     Row Name 01/21/20 1126             Time Calculation    Start Time  1122  -      Stop Time  1148  -      Time Calculation (min)  26 min  -      PT Received On  01/21/20  -      PT - Next Appointment  01/22/20  -      PT Goal Re-Cert Due Date  01/24/20  -         Time Calculation- PT    Total Timed Code Minutes- PT  12 minute(s)  -        User Key  (r) = Recorded By, (t) = Taken By, (c) = Cosigned By    Initials Name Provider Type    Jordana Lyons, PT Physical Therapist        Therapy Charges for Today     Code Description Service Date Service Provider Modifiers Qty    83571868602 HC PT EVAL MOD COMPLEXITY 2 1/21/2020 Jordana Colon, PT GP 1    50531905616 HC PT THER PROC EA 15 MIN 1/21/2020 Jordana Colon, PT GP 1          PT G-Codes  Outcome Measure Options: AM-PAC 6 Clicks Basic Mobility (PT)  AM-PAC 6 Clicks Score (PT): 17    Jordana Colon PT  1/21/2020

## 2020-01-21 NOTE — PROGRESS NOTES
Discharge Planning Assessment  [facility]     Patient Name: Kyler Horne  MRN: [id number]  Today's Date: 1/21/2020    Admit Date: 1/20/2020    Discharge Needs Assessment     Row Name 01/21/20 0938       Living Environment    Lives With  alone    Current Living Arrangements  home/apartment/condo    Primary Care Provided by  self    Provides Primary Care For  no one    Family Caregiver if Needed  child(emilia), adult    Family Caregiver Names  Georgie mary and son, Ronn Horne    Quality of Family Relationships  helpful;involved;supportive       Resource/Environmental Concerns    Resource/Environmental Concerns  none    Transportation Concerns  car, none       Transition Planning    Patient/Family Anticipates Transition to  home with family    Patient/Family Anticipated Services at Transition      Transportation Anticipated  family or friend will provide       Discharge Needs Assessment    Readmission Within the Last 30 Days  no previous admission in last 30 days    Concerns to be Addressed  basic needs;discharge planning    Equipment Currently Used at Home  walker, rolling;glucometer    Anticipated Changes Related to Illness  none    Outpatient/Agency/Support Group Needs  homecare agency    Discharge Facility/Level of Care Needs  home with home health    Provided post acute provider list?  Yes    Post Acute Provider List  Home Health    Post Acute Provider Quality & Resource List  Yes    Delivered To  Patient    Method of Delivery  In person    [facility] of [facility]         Discharge Plan     Row Name 01/21/20 0940       Plan    Plan  [facility] with [facility]     Plan Comments  Spoke with patient at bedside.  Facesheet, PCP ,and pharmacy verified.  Patient lives alone, but states that her children will be assisting her at [place]. Provided her with HH list and she [facility] .  Referral sent via [facility]. CCP will follow. Precious Bee RN        Destination       Coordination has not been started for this encounter.      Durable Medical Equipment      Coordination has not been started for this encounter.      Dialysis/Infusion      Coordination has not been started for this encounter.      Home Medical Care      Service Provider Request Status Selected Services Address Phone Number Fax Number    [facility] Pending - Request Sent N/A [id number] [place] [id number] [phone]      Therapy      Coordination has not been started for this encounter.      Community Resources      Coordination has not been started for this encounter.          Demographic Summary     Row Name 01/21/20 0938       General Information    Admission Type  inpatient    Arrived From  PACU/recovery room    Required Notices Provided  Important Message from Medicare    Referral Source  admission list    Reason for Consult  discharge planning    Preferred Language  English        Functional Status     Row Name 01/21/20 0938       Functional Status    Usual Activity Tolerance  good    Current Activity Tolerance  fair       Functional Status, IADL    Medications  independent    Meal Preparation  independent    Housekeeping  independent    Laundry  independent    Shopping  independent       Mental Status    General Appearance WDL  WDL       Mental Status Summary    Recent Changes in Mental Status/Cognitive Functioning  no changes        Psychosocial    No documentation.       Abuse/Neglect    No documentation.       Legal    No documentation.       Substance Abuse    No documentation.       Patient Forms    No documentation.           Precious Bee RN

## 2020-01-21 NOTE — PLAN OF CARE
Problem: Patient Care Overview  Goal: Plan of Care Review  Outcome: Ongoing (interventions implemented as appropriate)  Flowsheets (Taken 1/21/2020 7918)  Progress: improving  Plan of Care Reviewed With: patient  Outcome Summary: POD#1. VSS. PO PAIN MEDICATION HELPING WITH PAIN. UP TO CHAIR X1. VOIDING PER BRP. PATIENT IS DIABETIC  BUT TAKES HER LANTUS IF BLOOD SUGAR IS ABOVE 170. . GYPSY  IN PLACE. EDUCATION PROVIDED ON BLOOD SUGAR MONITORING  AND TAKING INSULIN  AS ORDERED.  PATIENT VERBALIZED UNDERSTANDING.

## 2020-01-21 NOTE — PROGRESS NOTES
Orthopedic Total Hip Progress Note      Patient: Kyler Horne  Date of Admission: 1/20/2020  YOB: 1940  Medical Record Number: [id number]    POD # :  1 Day Post-Op Procedure(s) (LRB):  TOTAL HIP ARTHROPLASTY (Left)    Systemic or Specific Complaints: No Complaints    Pain Relief: some relief    Physical Exam:  80 y.o.  female  Vitals:  Temp:  [96.5 °F (35.8 °C)-98 °F (36.7 °C)] 97.8 °F (36.6 °C)  Heart Rate:  [] 56  Resp:  [14-18] 16  BP: (131-161)/() 152/71  alert and oriented  Chest: Clear to auscultation  CV: Regular Rate and Rhythm  Abd: Soft, NT, with BS +  Ext: NV intact. ROM appropriate. Calf is soft and nontender. Negative Homans Sn  Skin: Incision clean dry and intact w/out signs or  symptoms of infection.    Activity: Mobilizing Per P.T.   Weight Bearing: As Tolerated    Data Review     Admission on 01/20/2020   Component Date Value Ref Range Status   • ABO Type 01/20/2020 O   Final   • RH type 01/20/2020 Positive   Final   • Antibody Screen 01/20/2020 Negative   Final   • T&S Expiration Date 01/20/2020 1/23/2020 11:59:59 PM   Final   • Glucose 01/20/2020 130  70 - 130 mg/dL Final   • Glucose 01/20/2020 123  70 - 130 mg/dL Final   • Glucose 01/20/2020 159* 70 - 130 mg/dL Final   • Hemoglobin 01/21/2020 14.9  12.0 - 15.9 g/dL Final   • Hematocrit 01/21/2020 45.3  34.0 - 46.6 % Final       No results found.    Medications:    amLODIPine 5 mg Oral QAM   carvedilol 3.125 mg Oral BID With Meals   docusate sodium 100 mg Oral BID   hydroCHLOROthiazide 25 mg Oral Daily   levothyroxine 100 mcg Oral Nightly   mupirocin  Each Nare BID   pantoprazole 40 mg Oral QAM   polyethylene glycol 17 g Oral BID   rivaroxaban 15 mg Oral Q PM     HYDROcodone-acetaminophen  •  HYDROcodone-acetaminophen  •  insulin glargine  •  melatonin  •  ondansetron **OR** ondansetron    Assessment:  Doing well POD  # 1 Day Post-Op Procedure(s) (LRB):  TOTAL HIP ARTHROPLASTY (Left)  Problem List Items Addressed This  Visit        Musculoskeletal and Integument    * (Principal) Primary osteoarthritis of left hip    Relevant Medications    ceFAZolin in dextrose (ANCEF) IVPB solution 2 g (Completed)    pregabalin (LYRICA) capsule 150 mg (Completed)    vancomycin (VANCOCIN) in iso-osmotic dextrose IVPB 1 g (premix) 200 mL (Completed)          Plan:  NO hip precautions  Continue efforts to mobilize  Continue Pain Control Measures -  Decrease norco to 1 tab po q 3 prn  Continue incisional Care  DVT prophylaxis - Xarelto , duplex u/s tomorrow prior to d/c    Discharge Plan:Home tomorrow    Del Dumont MD    Date: 1/21/2020  Time: 7:15 AM

## 2020-01-22 ENCOUNTER — APPOINTMENT (OUTPATIENT)
Dept: CARDIOLOGY | Facility: HOSPITAL | Age: 80
End: 2020-01-22

## 2020-01-22 VITALS
BODY MASS INDEX: 28.17 KG/M2 | TEMPERATURE: 98.3 F | WEIGHT: 153.06 LBS | HEART RATE: 63 BPM | RESPIRATION RATE: 16 BRPM | HEIGHT: 62 IN | SYSTOLIC BLOOD PRESSURE: 127 MMHG | DIASTOLIC BLOOD PRESSURE: 67 MMHG | OXYGEN SATURATION: 93 %

## 2020-01-22 LAB
ANION GAP SERPL CALCULATED.3IONS-SCNC: 13 MMOL/L (ref 5–15)
BH CV LOWER VASCULAR LEFT COMMON FEMORAL AUGMENT: NORMAL
BH CV LOWER VASCULAR LEFT COMMON FEMORAL COMPETENT: NORMAL
BH CV LOWER VASCULAR LEFT COMMON FEMORAL COMPRESS: NORMAL
BH CV LOWER VASCULAR LEFT COMMON FEMORAL PHASIC: NORMAL
BH CV LOWER VASCULAR LEFT COMMON FEMORAL SPONT: NORMAL
BH CV LOWER VASCULAR LEFT DISTAL FEMORAL COMPRESS: NORMAL
BH CV LOWER VASCULAR LEFT GASTRONEMIUS COMPRESS: NORMAL
BH CV LOWER VASCULAR LEFT GREATER SAPH AK COMPRESS: NORMAL
BH CV LOWER VASCULAR LEFT GREATER SAPH BK COMPRESS: NORMAL
BH CV LOWER VASCULAR LEFT MID FEMORAL AUGMENT: NORMAL
BH CV LOWER VASCULAR LEFT MID FEMORAL COMPETENT: NORMAL
BH CV LOWER VASCULAR LEFT MID FEMORAL COMPRESS: NORMAL
BH CV LOWER VASCULAR LEFT MID FEMORAL PHASIC: NORMAL
BH CV LOWER VASCULAR LEFT MID FEMORAL SPONT: NORMAL
BH CV LOWER VASCULAR LEFT PERONEAL COMPRESS: NORMAL
BH CV LOWER VASCULAR LEFT POPLITEAL AUGMENT: NORMAL
BH CV LOWER VASCULAR LEFT POPLITEAL COMPETENT: NORMAL
BH CV LOWER VASCULAR LEFT POPLITEAL COMPRESS: NORMAL
BH CV LOWER VASCULAR LEFT POPLITEAL PHASIC: NORMAL
BH CV LOWER VASCULAR LEFT POPLITEAL SPONT: NORMAL
BH CV LOWER VASCULAR LEFT POSTERIOR TIBIAL COMPRESS: NORMAL
BH CV LOWER VASCULAR LEFT PROFUNDA FEMORAL COMPRESS: NORMAL
BH CV LOWER VASCULAR LEFT PROXIMAL FEMORAL COMPRESS: NORMAL
BH CV LOWER VASCULAR LEFT SAPHENOFEMORAL JUNCTION COMPRESS: NORMAL
BH CV LOWER VASCULAR RIGHT COMMON FEMORAL AUGMENT: NORMAL
BH CV LOWER VASCULAR RIGHT COMMON FEMORAL COMPETENT: NORMAL
BH CV LOWER VASCULAR RIGHT COMMON FEMORAL COMPRESS: NORMAL
BH CV LOWER VASCULAR RIGHT COMMON FEMORAL PHASIC: NORMAL
BH CV LOWER VASCULAR RIGHT COMMON FEMORAL SPONT: NORMAL
BH CV LOWER VASCULAR RIGHT DISTAL FEMORAL COMPRESS: NORMAL
BH CV LOWER VASCULAR RIGHT GASTRONEMIUS COMPRESS: NORMAL
BH CV LOWER VASCULAR RIGHT GREATER SAPH AK COMPRESS: NORMAL
BH CV LOWER VASCULAR RIGHT GREATER SAPH BK COMPRESS: NORMAL
BH CV LOWER VASCULAR RIGHT MID FEMORAL AUGMENT: NORMAL
BH CV LOWER VASCULAR RIGHT MID FEMORAL COMPETENT: NORMAL
BH CV LOWER VASCULAR RIGHT MID FEMORAL COMPRESS: NORMAL
BH CV LOWER VASCULAR RIGHT MID FEMORAL PHASIC: NORMAL
BH CV LOWER VASCULAR RIGHT MID FEMORAL SPONT: NORMAL
BH CV LOWER VASCULAR RIGHT PERONEAL COMPRESS: NORMAL
BH CV LOWER VASCULAR RIGHT POPLITEAL AUGMENT: NORMAL
BH CV LOWER VASCULAR RIGHT POPLITEAL COMPETENT: NORMAL
BH CV LOWER VASCULAR RIGHT POPLITEAL COMPRESS: NORMAL
BH CV LOWER VASCULAR RIGHT POPLITEAL PHASIC: NORMAL
BH CV LOWER VASCULAR RIGHT POPLITEAL SPONT: NORMAL
BH CV LOWER VASCULAR RIGHT POSTERIOR TIBIAL COMPRESS: NORMAL
BH CV LOWER VASCULAR RIGHT PROFUNDA FEMORAL COMPRESS: NORMAL
BH CV LOWER VASCULAR RIGHT PROXIMAL FEMORAL COMPRESS: NORMAL
BH CV LOWER VASCULAR RIGHT SAPHENOFEMORAL JUNCTION COMPRESS: NORMAL
BUN BLD-MCNC: 20 MG/DL (ref 8–23)
BUN/CREAT SERPL: 17.4 (ref 7–25)
CALCIUM SPEC-SCNC: 8.2 MG/DL (ref 8.6–10.5)
CHLORIDE SERPL-SCNC: 98 MMOL/L (ref 98–107)
CO2 SERPL-SCNC: 26 MMOL/L (ref 22–29)
CREAT BLD-MCNC: 1.15 MG/DL (ref 0.57–1)
GFR SERPL CREATININE-BSD FRML MDRD: 45 ML/MIN/1.73
GLUCOSE BLD-MCNC: 176 MG/DL (ref 65–99)
GLUCOSE BLDC GLUCOMTR-MCNC: 171 MG/DL (ref 70–130)
GLUCOSE BLDC GLUCOMTR-MCNC: 264 MG/DL (ref 70–130)
HBA1C MFR BLD: 6.8 % (ref 4.8–5.6)
HCT VFR BLD AUTO: 42.2 % (ref 34–46.6)
HGB BLD-MCNC: 14.2 G/DL (ref 12–15.9)
POTASSIUM BLD-SCNC: 3.3 MMOL/L (ref 3.5–5.2)
SODIUM BLD-SCNC: 137 MMOL/L (ref 136–145)

## 2020-01-22 PROCEDURE — 82962 GLUCOSE BLOOD TEST: CPT

## 2020-01-22 PROCEDURE — 99024 POSTOP FOLLOW-UP VISIT: CPT | Performed by: ORTHOPAEDIC SURGERY

## 2020-01-22 PROCEDURE — 83036 HEMOGLOBIN GLYCOSYLATED A1C: CPT | Performed by: NURSE PRACTITIONER

## 2020-01-22 PROCEDURE — 80048 BASIC METABOLIC PNL TOTAL CA: CPT | Performed by: NURSE PRACTITIONER

## 2020-01-22 PROCEDURE — 63710000001 INSULIN LISPRO (HUMAN) PER 5 UNITS: Performed by: NURSE PRACTITIONER

## 2020-01-22 PROCEDURE — 93970 EXTREMITY STUDY: CPT

## 2020-01-22 PROCEDURE — 97150 GROUP THERAPEUTIC PROCEDURES: CPT

## 2020-01-22 PROCEDURE — 97110 THERAPEUTIC EXERCISES: CPT

## 2020-01-22 PROCEDURE — 85014 HEMATOCRIT: CPT | Performed by: NURSE PRACTITIONER

## 2020-01-22 PROCEDURE — 85018 HEMOGLOBIN: CPT | Performed by: NURSE PRACTITIONER

## 2020-01-22 RX ORDER — POLYETHYLENE GLYCOL 3350 17 G/17G
17 POWDER, FOR SOLUTION ORAL 2 TIMES DAILY
Qty: 510 G | Refills: 0 | Status: SHIPPED | OUTPATIENT
Start: 2020-01-22 | End: 2020-02-04

## 2020-01-22 RX ORDER — HYDROMORPHONE HYDROCHLORIDE 2 MG/1
2 TABLET ORAL EVERY 4 HOURS PRN
Qty: 40 TABLET | Refills: 0 | Status: SHIPPED | OUTPATIENT
Start: 2020-01-22 | End: 2020-01-31 | Stop reason: SDUPTHER

## 2020-01-22 RX ORDER — POTASSIUM CHLORIDE 750 MG/1
40 CAPSULE, EXTENDED RELEASE ORAL AS NEEDED
Status: DISCONTINUED | OUTPATIENT
Start: 2020-01-22 | End: 2020-01-22

## 2020-01-22 RX ORDER — ONDANSETRON 4 MG/1
4 TABLET, FILM COATED ORAL EVERY 6 HOURS PRN
Qty: 10 TABLET | Refills: 0 | Status: SHIPPED | OUTPATIENT
Start: 2020-01-22

## 2020-01-22 RX ORDER — PSEUDOEPHEDRINE HCL 30 MG
100 TABLET ORAL 2 TIMES DAILY
Qty: 50 EACH | Refills: 0 | Status: SHIPPED | OUTPATIENT
Start: 2020-01-22 | End: 2020-02-04

## 2020-01-22 RX ORDER — POTASSIUM CHLORIDE 1.5 G/1.77G
40 POWDER, FOR SOLUTION ORAL AS NEEDED
Status: DISCONTINUED | OUTPATIENT
Start: 2020-01-22 | End: 2020-01-22

## 2020-01-22 RX ORDER — POTASSIUM CHLORIDE 7.45 MG/ML
10 INJECTION INTRAVENOUS
Status: DISCONTINUED | OUTPATIENT
Start: 2020-01-22 | End: 2020-01-22

## 2020-01-22 RX ADMIN — CARVEDILOL 3.12 MG: 3.12 TABLET, FILM COATED ORAL at 08:48

## 2020-01-22 RX ADMIN — HYDROCHLOROTHIAZIDE 25 MG: 25 TABLET ORAL at 08:48

## 2020-01-22 RX ADMIN — DOCUSATE SODIUM 100 MG: 100 CAPSULE, LIQUID FILLED ORAL at 08:49

## 2020-01-22 RX ADMIN — INSULIN LISPRO 6 UNITS: 100 INJECTION, SOLUTION INTRAVENOUS; SUBCUTANEOUS at 12:51

## 2020-01-22 RX ADMIN — HYDROMORPHONE HYDROCHLORIDE 2 MG: 2 TABLET ORAL at 13:41

## 2020-01-22 RX ADMIN — HYDROMORPHONE HYDROCHLORIDE 2 MG: 2 TABLET ORAL at 08:49

## 2020-01-22 RX ADMIN — HYDROMORPHONE HYDROCHLORIDE 2 MG: 2 TABLET ORAL at 04:49

## 2020-01-22 RX ADMIN — POTASSIUM CHLORIDE 40 MEQ: 750 CAPSULE, EXTENDED RELEASE ORAL at 15:37

## 2020-01-22 RX ADMIN — PANTOPRAZOLE SODIUM 40 MG: 40 TABLET, DELAYED RELEASE ORAL at 06:32

## 2020-01-22 RX ADMIN — AMLODIPINE BESYLATE 5 MG: 5 TABLET ORAL at 08:48

## 2020-01-22 NOTE — DISCHARGE SUMMARY
Patient Name: Kyler Horne  Patient YOB: 1940    Date of Admission:  1/20/2020  Date of Discharge:  1/22/2020  Discharge Diagnosis: TOTAL HIP ARTHROPLASTY  Presenting Problem/History of Present Illness: Primary osteoarthritis of left hip [M16.12]  OA (osteoarthritis) of hip [M16.9]  Admitting Physician: Dr Del Dumont  Consults:   Consults     Date and Time Order Name Status Description    1/21/2020 2616 Inpatient Hospitalist Consult Completed           DETAILS OF HOSPITAL STAY:  Patient is a 80 y.o. female was admitted to the floor following the above procedure and underwent an uncomplicated hospital stay.  Patient did well with physical therapy and was ambulating well without problems.  On the day of discharge the wound was clean, dry and intact and calf was soft and non tender and Homans sign was negative.  Patient was tolerating  without problems.  Patient will be discharged home.    Condition on Discharge:  Stable    Vital Signs  Temp:  [97.5 °F (36.4 °C)-99.8 °F (37.7 °C)] 98.3 °F (36.8 °C)  Heart Rate:  [53-69] 63  Resp:  [16] 16  BP: (112-141)/(57-74) 127/67    LABS:   Admission on 01/20/2020   Component Date Value Ref Range Status   • ABO Type 01/20/2020 O   Final   • RH type 01/20/2020 Positive   Final   • Antibody Screen 01/20/2020 Negative   Final   • T&S Expiration Date 01/20/2020 1/23/2020 11:59:59 PM   Final   • Glucose 01/20/2020 130  70 - 130 mg/dL Final   • Glucose 01/20/2020 123  70 - 130 mg/dL Final   • Glucose 01/20/2020 159* 70 - 130 mg/dL Final   • Hemoglobin 01/21/2020 14.9  12.0 - 15.9 g/dL Final   • Hematocrit 01/21/2020 45.3  34.0 - 46.6 % Final   • Glucose 01/21/2020 182* 70 - 130 mg/dL Final   • Glucose 01/21/2020 186* 70 - 130 mg/dL Final   • Glucose 01/21/2020 180* 70 - 130 mg/dL Final   • Hemoglobin 01/22/2020 14.2  12.0 - 15.9 g/dL Final   • Hematocrit 01/22/2020 42.2  34.0 - 46.6 % Final   • Glucose 01/22/2020 176* 65 - 99 mg/dL Final   • BUN 01/22/2020 20  8 - 23  mg/dL Final   • Creatinine 01/22/2020 1.15* 0.57 - 1.00 mg/dL Final   • Sodium 01/22/2020 137  136 - 145 mmol/L Final   • Potassium 01/22/2020 3.3* 3.5 - 5.2 mmol/L Final   • Chloride 01/22/2020 98  98 - 107 mmol/L Final   • CO2 01/22/2020 26.0  22.0 - 29.0 mmol/L Final   • Calcium 01/22/2020 8.2* 8.6 - 10.5 mg/dL Final   • eGFR Non African Amer 01/22/2020 45* >60 mL/min/1.73 Final   • BUN/Creatinine Ratio 01/22/2020 17.4  7.0 - 25.0 Final   • Anion Gap 01/22/2020 13.0  5.0 - 15.0 mmol/L Final   • Hemoglobin A1C 01/22/2020 6.80* 4.80 - 5.60 % Final   • Glucose 01/22/2020 171* 70 - 130 mg/dL Final       No results found.        Discharge Medications     Discharge Medications      New Medications      Instructions Start Date   docusate sodium 100 MG capsule   100 mg, Oral, 2 Times Daily      ondansetron 4 MG tablet  Commonly known as:  ZOFRAN   4 mg, Oral, Every 6 Hours PRN      polyethylene glycol pack packet  Commonly known as:  MIRALAX   17 g, Oral, 2 Times Daily         Changes to Medications      Instructions Start Date   nitrofurantoin (macrocrystal-monohydrate) 100 MG capsule  Commonly known as:  MACROBID  What changed:  additional instructions   100 mg, Oral, 2 Times Daily      rivaroxaban 15 MG tablet  Commonly known as:  XARELTO  What changed:  additional instructions   15 mg, Oral, Every Evening         Continue These Medications      Instructions Start Date   ADULT ONE DAILY GUMMIES PO   2 tablets, Oral, Daily, HELD FOR OR      amLODIPine 5 MG tablet  Commonly known as:  NORVASC   5 mg, Oral, Every Morning      BIOTIN PO   1 tablet, Oral, Daily      busPIRone 5 MG tablet  Commonly known as:  BUSPAR   5 mg, Oral, 3 Times Daily PRN      carvedilol 3.125 MG tablet  Commonly known as:  COREG   3.125 mg, Oral, 2 Times Daily With Meals      FIBER SELECT GUMMIES PO   5 g, Oral, As Needed      hydroCHLOROthiazide 25 MG tablet  Commonly known as:  HYDRODIURIL   25 mg, Oral, Daily      insulin glargine 100 UNIT/ML  injection  Commonly known as:  LANTUS   19 Units, Subcutaneous, Nightly      [employer] LANCETS THIN 26G misc   USE THREE TIMES A DAY TO TEST BLOOD SUGAR      levothyroxine 100 MCG tablet  Commonly known as:  SYNTHROID, LEVOTHROID   100 mcg, Oral, Nightly      pantoprazole 40 MG EC tablet  Commonly known as:  PROTONIX   40 mg, Oral, Every Morning      pravastatin 40 MG tablet  Commonly known as:  PRAVACHOL   40 mg, Oral, Nightly      PROBIOTIC DAILY PO   1 tablet, Oral, Daily      TYLENOL EXTRA STRENGTH PO   1,000 mg, Oral, As Needed      VITAMIN B 12 PO   3,000 mcg, Oral, Daily, HELD FOR OR      vitamin d 125 MCG (5000 UT) capsule  Commonly known as:  CHOLECALIFEROL   5,000 Units, Oral, Daily      WELCHOL 625 MG tablet  Generic drug:  colesevelam   625 mg, Oral, Daily         Stop These Medications    Chlorhexidine Gluconate Cloth 2 % pads     mupirocin 2 % nasal ointment  Commonly known as:  BACTROBAN            Activity at Discharge:     Discharge Instructions:   1)  Patient is to continue with physical therapy exercises daily and continue working with the physical therapist as ordered.  2)  Follow Posterior hip precautions.   3)  Patient may weight bear as tolerated.   4)  Apply ice regularly. You may ice for long periods of time as long as ice is not directly on the skin. Patient instructed on frequent calf pumping exercises.  Patient also instructed on incentive spirometer during hospitalization and encouraged to continue to use at home regularly.   5)  The dressing should be left in place. If waterproof dressing is intact the patient may shower immediately following discharge. If the dressing becomes disloged or saturated it should be changed. Please refer to the GYPSY information sheet if you have any questions about the dressing.  If you have a home health nurse or therapist they can be contacted to assist with dressing change or repair. You may also call the [facility] dressing hotline for questions related to the  dressing ([phone]). If there still other problems or questions related to the dressing despite these measures then you can contact Anastasia at our office [phone].   6)  Follow up appointment in 2 weeks - patient to call the office at [phone] to schedule. 7)  Patient will be discharged on Xarelto as directed    Complete Discharge Diagnosis:    Patient Active Problem List   Diagnosis   • Primary osteoarthritis of right hip   • OA (osteoarthritis) of hip   • Primary osteoarthritis of left hip   • Type 2 diabetes mellitus (CMS/HCC)   • Sleep apnea   • HTN (hypertension), benign   • Hypothyroidism (acquired)   • Nausea           Follow-up Appointments  Future Appointments   Date Time Provider Department Center   2/6/2020  8:30 AM Del Dumont MD [facility] [address] None     Additional Instructions for the Follow-ups that You Need to Schedule     Ambulatory Referral to Home Health   As directed      PT to see for Home PT protocol. Daily for first week then 3x/ wk for second week. Staples to be removed at f/u appointment in 2 weeks.    Order Comments:  PT to see for Home PT protocol. Daily for first week then 3x/ wk for second week. Staples to be removed at f/u appointment in 2 weeks.     Face to Face Visit Date:  1/22/2020    Follow-up provider for Plan of Care?:  I will be treating the patient on an ongoing basis.  Please send me the Plan of Care for signature.    Follow-up provider:  DEL DUMONT [[id number]]    Reason/Clinical Findings:  post surgical    Describe mobility limitations that make leaving home difficult:  Requires the assistance of another to leave home    Nursing/Therapeutic Services Requested:  Physical Therapy    PT orders:  Total joint pathway    Frequency:  1 Week 1         Discharge Follow-up with Specialty: Orthopedics; 2 Weeks   As directed      Specialty:  Orthopedics    Follow Up:  2 Weeks    Follow Up Details:  Return to the office to see Dr. Del Peterson  RN  01/22/20  11:09 AM    Del Dumont MD

## 2020-01-22 NOTE — NURSING NOTE
Left message for Dr. Dumont at [facility] to call back regarding patient's home pain medication Rx.  Spoke with Marilin.

## 2020-01-22 NOTE — PROGRESS NOTES
Name: Kyler Horne ADMIT: 2020   : 1940  PCP: Trang Tucker MD    MRN: [id number] LOS: 2 days   AGE/SEX: 80 y.o. female  ROOM: [facility]     Subjective   Subjective   Patient doing well postoperatively with oral pain medication.  Voiding without difficulty.  Denies chest pain, shortness of breath, fever, or chills    Review of Systems     Objective   Objective   Vital Signs  Temp:  [97.5 °F (36.4 °C)-99.8 °F (37.7 °C)] 98.3 °F (36.8 °C)  Heart Rate:  [53-69] 63  Resp:  [16] 16  BP: (112-141)/(57-74) 127/67  SpO2:  [92 %-96 %] 93 %  on   ;   Device (Oxygen Therapy): room air  Body mass index is 28 kg/m².  Physical Exam   Constitutional: She is oriented to person, place, and time. She appears well-developed and well-nourished. No distress.   HENT:   Head: Normocephalic and atraumatic.   Eyes: Conjunctivae and EOM are normal.   Neck: Normal range of motion.   Cardiovascular: Normal rate, regular rhythm, normal heart sounds and intact distal pulses.   Pulmonary/Chest: Effort normal and breath sounds normal. No respiratory distress.   Abdominal: Soft. Bowel sounds are normal. She exhibits no distension. There is no tenderness.   Musculoskeletal: She exhibits tenderness (expected postop tenderness).   Neurovascular status intact   Neurological: She is alert and oriented to person, place, and time. No cranial nerve deficit.   Skin: Skin is warm and dry.   Psychiatric: She has a normal mood and affect. Her behavior is normal.   Nursing note and vitals reviewed.      Results Review:       I reviewed the patient's new clinical results.  Results from last 7 days   Lab Units 20  0452 20  0401   HEMOGLOBIN g/dL 14.2 14.9     Results from last 7 days   Lab Units 20  0452   SODIUM mmol/L 137   POTASSIUM mmol/L 3.3*   CHLORIDE mmol/L 98   CO2 mmol/L 26.0   BUN mg/dL 20   CREATININE mg/dL 1.15*   GLUCOSE mg/dL 176*   Estimated Creatinine Clearance: 35.6 mL/min (A) (by C-G formula based on SCr of  1.15 mg/dL (H)).    Results from last 7 days   Lab Units 01/22/20  0452   CALCIUM mg/dL 8.2*       Hemoglobin A1C   Date/Time Value Ref Range Status   01/22/2020 0452 6.80 (H) 4.80 - 5.60 % Final     Glucose   Date/Time Value Ref Range Status   01/22/2020 1204 264 (H) 70 - 130 mg/dL Final   01/22/2020 0616 171 (H) 70 - 130 mg/dL Final   01/21/2020 2038 180 (H) 70 - 130 mg/dL Final   01/21/2020 1624 186 (H) 70 - 130 mg/dL Final   01/21/2020 1030 182 (H) 70 - 130 mg/dL Final   01/20/2020 2037 159 (H) 70 - 130 mg/dL Final   01/20/2020 1739 123 70 - 130 mg/dL Final         amLODIPine 5 mg Oral QAM   carvedilol 3.125 mg Oral BID With Meals   docusate sodium 100 mg Oral BID   hydroCHLOROthiazide 25 mg Oral Daily   insulin glargine 19 Units Subcutaneous Nightly   insulin lispro 0-9 Units Subcutaneous 4x Daily With Meals & Nightly   levothyroxine 100 mcg Oral Nightly   pantoprazole 40 mg Oral QAM   polyethylene glycol 17 g Oral BID   rivaroxaban 15 mg Oral Q PM      Diet Regular; Consistent Carbohydrate       Assessment/Plan     Active Hospital Problems    Diagnosis  POA   • **Primary osteoarthritis of left hip [M16.12]  Unknown   • Type 2 diabetes mellitus (CMS/HCC) [E11.9]  Yes   • Sleep apnea [G47.30]  Yes   • HTN (hypertension), benign [I10]  Yes   • Hypothyroidism (acquired) [E03.9]  Yes   • Nausea [R11.0]  Unknown   • OA (osteoarthritis) of hip [M16.9]  Yes      Resolved Hospital Problems   No resolved problems to display.     Results from last 7 days   Lab Units 01/22/20  0452   HEMOGLOBIN A1C % 6.80*         80 y.o. female admitted with Primary osteoarthritis of left hip.       Ms. Horne is a 80 y.o. female who is s/p TOTAL HIP ARTHROPLASTY.     · She seems to be doing well thus far postoperatively.   · Continue her long acting insulin - LANTUS 19 units SC nightly.  · HUMALOG - moderate dose correctional factor as needed.  · Monitor glucose QAC and QHS. For any BG less than 70 mg/dL, treat per hospital  protocol  · HgbA1C 6.8.  NCS diet  · Replace K per protocol  · Monitor BP; stable   · Last creatinine on file 1/13 was 1.37 and 1.15 today  · Cpap w/sleep for sleep apnea   · Xarelto has been ordered for DVT prophylaxis per surgery.  · She was encouraged to use incentive spirometer as instructed at home.   ok for discharge. [facility] will sign off    SARAH Bear  [facility]  01/22/20  1:18 PM

## 2020-01-22 NOTE — PLAN OF CARE
80/F POD#2 left YOVANY.  ALOx4, RA, lungs clear, BS normoactive x4, voiding independently.  Up x1 BRP with walker/gait belt.  2+ pedal pulses, no c/o numbness/tingling in operative extremity, dressing CDI.  Pain well-controlled with PO pain meds only.  PIV removed.  D/C home with HH today.        Problem: Patient Care Overview  Goal: Plan of Care Review  Outcome: Outcome(s) achieved  Flowsheets (Taken 1/22/2020 1459)  Progress: improving  Plan of Care Reviewed With: patient; son  Outcome Summary: 80/F POD#2 left YOVANY.  ALOx4, RA, lungs clear, BS normoactive x4, voiding independently.  Up x1 BRP with walker/gait belt.  2+ pedal pulses, no c/o numbness/tingling in operative extremity, dressing CDI.  Pain well-controlled with PO pain meds only.  PIV removed.  D/C home with HH today.

## 2020-01-22 NOTE — PLAN OF CARE
Problem: Patient Care Overview  Goal: Plan of Care Review  Flowsheets (Taken 1/22/2020 0051)  Progress: improving  Plan of Care Reviewed With: patient  Outcome Summary: POD#2 VSS. PO PAIN MEDICATION HELPING WITH PAIN. AMBULATING WITH 1 ASSIST. VOIDING PER BRP. GYPSY TO LEFT HIP. POSSIBLE D/C TODAY. EDUCATIONBP MONITORING  PLUS BLOOD SUGAR MONITORING. PATIENT VERBALIZED UNDERSTANDING.

## 2020-01-23 NOTE — PROGRESS NOTES
Case Management Discharge Note      [facility]    Provided post acute provider list?: Yes  Post Acute Provider List: Home Health  Post Acute Provider Quality & Resource List: Yes  Delivered To: Patient  Method of Delivery: In person    Destination      No service has been selected for the patient.      Durable Medical Equipment      No service has been selected for the patient.      Dialysis/Infusion      No service has been selected for the patient.      Home Medical Care - Selection Complete      Service Provider Request Status Selected Services Address Phone Number Fax Number    [facility] [address] [address] [phone]      Therapy      No service has been selected for the patient.      Community Resources      No service has been selected for the patient.             Final Discharge Disposition Code: 06 - home with home health care

## 2020-01-23 NOTE — THERAPY TREATMENT NOTE
Acute Care - Physical Therapy Treatment Note  [facility]     Patient Name: Kyler Horne  : 1940  MRN: [id number]  Today's Date: 2020             Admit Date: 2020    Visit Dx:    ICD-10-CM ICD-9-CM   1. Primary osteoarthritis of left hip M16.12 715.15     Patient Active Problem List   Diagnosis   • Primary osteoarthritis of right hip   • OA (osteoarthritis) of hip   • Primary osteoarthritis of left hip   • Type 2 diabetes mellitus (CMS/HCC)   • Sleep apnea   • HTN (hypertension), benign   • Hypothyroidism (acquired)   • Nausea       Therapy Treatment    Rehabilitation Treatment Summary     Row Name                Wound 20 1552 Left hip Incision    Wound - Properties Group Date first assessed: 20 [SD] Time first assessed:  [SD] Side: Left [SD] Location: hip [SD] Primary Wound Type: Incision [SD] Recorded by:  [SD] Maria M Gunter RN 20 [id number]      User Key  (r) = Recorded By, (t) = Taken By, (c) = Cosigned By    Initials Name Effective Dates Discipline    SD Maria M Gunter, RN 16 -  Nurse                       PT Recommendation and Plan           Time Calculation:     Therapy Charges for Today     Code Description Service Date Service Provider Modifiers Qty    08580167393 HC PT THER PROC EA 15 MIN 2020 Akila Weber PTA GP 2    27011394833 HC PT THER PROC GROUP 2020 Akila Weber PTA GP 1          PT G-Codes  Outcome Measure Options: AM-PAC 6 Clicks Basic Mobility (PT)  AM-PAC 6 Clicks Score (PT): 17    Akila Weber PTA  2020

## 2020-01-31 DIAGNOSIS — M16.12 PRIMARY OSTEOARTHRITIS OF LEFT HIP: ICD-10-CM

## 2020-01-31 RX ORDER — HYDROMORPHONE HYDROCHLORIDE 2 MG/1
2 TABLET ORAL EVERY 4 HOURS PRN
Qty: 40 TABLET | Refills: 0 | Status: SHIPPED | OUTPATIENT
Start: 2020-01-31

## 2020-02-03 ENCOUNTER — TELEPHONE (OUTPATIENT)
Dept: ORTHOPEDIC SURGERY | Facility: CLINIC | Age: 80
End: 2020-02-03

## 2020-02-06 ENCOUNTER — OFFICE VISIT (OUTPATIENT)
Dept: ORTHOPEDIC SURGERY | Facility: CLINIC | Age: 80
End: 2020-02-06

## 2020-02-06 VITALS — HEIGHT: 62 IN | BODY MASS INDEX: 28.52 KG/M2 | TEMPERATURE: 97.3 F | WEIGHT: 155 LBS

## 2020-02-06 DIAGNOSIS — Z96.642 STATUS POST TOTAL REPLACEMENT OF LEFT HIP: Primary | ICD-10-CM

## 2020-02-06 PROCEDURE — 73502 X-RAY EXAM HIP UNI 2-3 VIEWS: CPT | Performed by: ORTHOPAEDIC SURGERY

## 2020-02-06 PROCEDURE — 99024 POSTOP FOLLOW-UP VISIT: CPT | Performed by: ORTHOPAEDIC SURGERY

## 2020-02-06 NOTE — PROGRESS NOTES
Kyler Horne : 1940 MRN: [id number] DATE: 2020    DIAGNOSIS: 2 week follow up left total hip     SUBJECTIVE:Patient returns today for 2 week follow up of left total hip replacement. Patient reports doing well with no unusual complaints. Appears to be progressing appropriately.    OBJECTIVE:   Exam:. The incision is healing appropriately. No sign of infection. Range of motion is progressing as expected. The calf is soft and nontender with a negative Homans sign.    DIAGNOSTIC STUDIES  Xrays: 2 views of the left hip (AP pelvis and lateral left hip) were ordered and reviewed for evaluation of recent hip replacement. They demonstrate a well positioned, well aligned hip replacement without complicating factors noted. In comparison with previous films there has been interval implant placement.    ASSESSMENT: 2 week status post left hip replacement.    PLAN: 1) Staples removed and steri strips applied   2) PT exercises   3) Discontinue HATTIE hose   4) Continue ice PRN   5) WBAT   6) aspirin 325 mg orally every day for 2 weeks   7) Follow up in 6 weeks with repeat Xrays of left hip (2views)    Del Dumont MD  2020

## 2020-05-12 ENCOUNTER — TELEPHONE (OUTPATIENT)
Dept: ORTHOPEDIC SURGERY | Facility: CLINIC | Age: 80
End: 2020-05-12

## 2020-05-12 RX ORDER — CEPHALEXIN 500 MG/1
CAPSULE ORAL
Qty: 4 CAPSULE | Refills: 2 | Status: SHIPPED | OUTPATIENT
Start: 2020-05-12

## 2020-05-12 NOTE — TELEPHONE ENCOUNTER
S/P LEFT YOVANY BY RBB 1/21/20  Has 2 upcoming dental appt's. Calling for premedication. [facility] in [place].

## 2020-05-12 NOTE — TELEPHONE ENCOUNTER
Have E prescribed a new prescription to [facility] pharmacy at [phone] for Keflex 500 mg, #4, directions are to take all 4 capsules 1 hour prior to procedure.  Refill x2 per RBB.  Patient has been notified

## 2021-07-12 ENCOUNTER — OFFICE VISIT (OUTPATIENT)
Dept: ORTHOPEDIC SURGERY | Facility: CLINIC | Age: 81
End: 2021-07-12

## 2021-07-12 VITALS — BODY MASS INDEX: 30 KG/M2 | WEIGHT: 163 LBS | HEIGHT: 62 IN | TEMPERATURE: 97.8 F

## 2021-07-12 DIAGNOSIS — M19.011 OSTEOARTHRITIS OF RIGHT SHOULDER, UNSPECIFIED OSTEOARTHRITIS TYPE: Primary | ICD-10-CM

## 2021-07-12 DIAGNOSIS — R52 PAIN: ICD-10-CM

## 2021-07-12 PROCEDURE — 99213 OFFICE O/P EST LOW 20 MIN: CPT | Performed by: ORTHOPAEDIC SURGERY

## 2021-07-12 PROCEDURE — 20610 DRAIN/INJ JOINT/BURSA W/O US: CPT | Performed by: ORTHOPAEDIC SURGERY

## 2021-07-12 PROCEDURE — 73030 X-RAY EXAM OF SHOULDER: CPT | Performed by: ORTHOPAEDIC SURGERY

## 2021-07-12 RX ORDER — METHYLPREDNISOLONE ACETATE 80 MG/ML
80 INJECTION, SUSPENSION INTRA-ARTICULAR; INTRALESIONAL; INTRAMUSCULAR; SOFT TISSUE
Status: COMPLETED | OUTPATIENT
Start: 2021-07-12 | End: 2021-07-12

## 2021-07-12 RX ORDER — LIDOCAINE HYDROCHLORIDE 20 MG/ML
4 INJECTION, SOLUTION EPIDURAL; INFILTRATION; INTRACAUDAL; PERINEURAL
Status: COMPLETED | OUTPATIENT
Start: 2021-07-12 | End: 2021-07-12

## 2021-07-12 RX ADMIN — METHYLPREDNISOLONE ACETATE 80 MG: 80 INJECTION, SUSPENSION INTRA-ARTICULAR; INTRALESIONAL; INTRAMUSCULAR; SOFT TISSUE at 11:51

## 2021-07-12 RX ADMIN — LIDOCAINE HYDROCHLORIDE 4 ML: 20 INJECTION, SOLUTION EPIDURAL; INFILTRATION; INTRACAUDAL; PERINEURAL at 11:51

## 2021-07-12 NOTE — PROGRESS NOTES
Large Joint Arthrocentesis: R subacromial bursa  Date/Time: 7/12/2021 11:51 AM  Consent given by: patient  Site marked: site marked  Timeout: Immediately prior to procedure a time out was called to verify the correct patient, procedure, equipment, support staff and site/side marked as required   Supporting Documentation  Indications: pain   Procedure Details  Location: shoulder - R subacromial bursa  Preparation: Patient was prepped and draped in the usual sterile fashion  Needle size: 22 G  Approach: posterior  Medications administered: 80 mg methylPREDNISolone acetate 80 MG/ML; 4 mL lidocaine PF 2% 2 %  Patient tolerance: patient tolerated the procedure well with no immediate complications        General Exam    Patient: Kyler Horne    YOB: 1940    Medical Record Number: [id number]    Chief Complaints: Right shoulder pain    History of Present Illness:     81 y.o. female patient who presents for dilation right shoulder pain.  Patient states that symptoms about 9 months perhaps longer.  She does have some general shoulder pain complains of some decreased function which she has difficulty reaching the back of her head.  No recent trauma.  Patient is right-hand dominant.    Denies any numbness or tingling.  Denies any fevers, cough or shortness of breath.    Allergies:   Allergies   Allergen Reactions   • Ciprofloxacin Diarrhea   • Clavulanic Acid Itching   • Hydrocodone GI Intolerance       Home Medications:      Current Outpatient Medications:   •  Acetaminophen (TYLENOL EXTRA STRENGTH PO), Take 1,000 mg by mouth As Needed., Disp: , Rfl:   •  amLODIPine (NORVASC) 5 MG tablet, Take 5 mg by mouth Every Morning., Disp: , Rfl:   •  BIOTIN PO, Take 1 tablet by mouth Daily., Disp: , Rfl:   •  busPIRone (BUSPAR) 5 MG tablet, Take 5 mg by mouth 3 (Three) Times a Day As Needed., Disp: , Rfl:   •  carvedilol (COREG) 3.125 MG tablet, Take 3.125 mg by mouth 2 (two) times a day with meals., Disp: , Rfl:   •   colesevelam (WELCHOL) 625 MG tablet, Take 625 mg by mouth Daily., Disp: , Rfl:   •  Cyanocobalamin (VITAMIN B 12 PO), Take 3,000 mcg by mouth Daily. HELD FOR OR, Disp: , Rfl:   •  FIBER SELECT GUMMIES PO, Take 5 g by mouth As Needed., Disp: , Rfl:   •  hydroCHLOROthiazide (HYDRODIURIL) 25 MG tablet, Take 25 mg by mouth Daily., Disp: , Rfl:   •  HYDROmorphone (DILAUDID) 2 MG tablet, Take 1 tablet by mouth Every 4 (Four) Hours As Needed for Moderate Pain  or Severe Pain  for up to 40 doses., Disp: 40 tablet, Rfl: 0  •  insulin glargine (LANTUS) 100 UNIT/ML injection, Inject 19 Units under the skin into the appropriate area as directed Every Night., Disp: , Rfl:   •  [employer] LANCETS THIN 26G misc, USE THREE TIMES A DAY TO TEST BLOOD SUGAR, Disp: , Rfl:   •  levothyroxine (SYNTHROID, LEVOTHROID) 100 MCG tablet, Take 100 mcg by mouth Every Night., Disp: , Rfl:   •  Multiple Vitamins-Minerals (ADULT ONE DAILY GUMMIES PO), Take 2 tablets by mouth Daily. HELD FOR OR, Disp: , Rfl:   •  pantoprazole (PROTONIX) 40 MG EC tablet, Take 40 mg by mouth Every Morning., Disp: , Rfl:   •  pravastatin (PRAVACHOL) 40 MG tablet, Take 40 mg by mouth Every Night., Disp: , Rfl:   •  Probiotic Product (PROBIOTIC DAILY PO), Take 1 tablet by mouth Daily., Disp: , Rfl:   •  rivaroxaban (XARELTO) 15 MG tablet, Take 1 tablet by mouth Every Evening., Disp: , Rfl:   •  vitamin d (CHOLECALIFEROL) 5000 units capsule, Take 5,000 Units by mouth Daily., Disp: , Rfl:   •  cephalexin (Keflex) 500 MG capsule, Take all 4 caps 1 hour prior to procedure, Disp: 4 capsule, Rfl: 2  •  nitrofurantoin, macrocrystal-monohydrate, (MACROBID) 100 MG capsule, Take 1 capsule by mouth 2 (Two) Times a Day. (Patient taking differently: Take 100 mg by mouth 2 (Two) Times a Day. x7 days per pharmacy), Disp: 14 capsule, Rfl: 0  •  ondansetron (ZOFRAN) 4 MG tablet, Take 1 tablet by mouth Every 6 (Six) Hours As Needed for Nausea or Vomiting for up to 10 doses., Disp: 10 tablet,  Rfl: 0    Past Medical History:   Diagnosis Date   • Diabetes mellitus (CMS/HCC)    • Diarrhea    • Difficulty sleeping    • Easy bruising    • GERD (gastroesophageal reflux disease)    • History of heart attack 2011, 2013   • History of kidney stones    • History of skin cancer    • Hyperlipidemia    • Hypertension    • Hypothyroidism    • Joint pain    • On anticoagulant therapy    • Osteoarthritis    • Paroxysmal atrial fibrillation (CMS/HCC)    • Ringing in ear    • Sleep apnea     CPAP       Past Surgical History:   Procedure Laterality Date   • CHOLECYSTECTOMY     • COLON RESECTION  2011   • DILATATION AND CURETTAGE     • EXTRACORPOREAL SHOCK WAVE LITHOTRIPSY (ESWL)     • SKIN CANCER EXCISION     • TONSILLECTOMY     • TOTAL HIP ARTHROPLASTY Right 4/27/2018    Procedure: TOTAL HIP ARTHROPLASTY;  Surgeon: Del Dumont MD;  Location: [facility] OR;  Service: Orthopedics   • TOTAL HIP ARTHROPLASTY Left 1/20/2020    Procedure: TOTAL HIP ARTHROPLASTY;  Surgeon: Del Dumont MD;  Location: [facility];  Service: Orthopedics       Social History     Occupational History   • Not on file   Tobacco Use   • Smoking status: Former Smoker     Packs/day: 0.25     Years: 12.00     Pack years: 3.00     Types: Cigarettes   • Smokeless tobacco: Never Used   • Tobacco comment: QUIT OVER 50 YRS AGO   Substance and Sexual Activity   • Alcohol use: No   • Drug use: No   • Sexual activity: Defer      Social History     Social History Narrative   • Not on file       Family History   Problem Relation Age of Onset   • Breast cancer Mother    • Hypertension Mother    • Diabetes Mother    • Heart disease Father    • Diabetes Father    • Stroke Sister    • Lung cancer Sister    • Lung disease Sister    • Diabetes Sister    • Malig Hyperthermia Neg Hx        Review of Systems:      Constitutional: Denies fever, shaking or chills   Eyes: Denies change in visual acuity   HEENT: Denies nasal congestion or sore throat   Respiratory:  "Denies cough or shortness of breath   Cardiovascular: Denies chest pain or edema  Endocrine: Denies tremors, palpitations, intolerance of heat or cold, polyuria, polydipsia.  GI: Denies abdominal pain, nausea, vomiting, bloody stools or diarrhea  : Denies frequency, urgency, incontinence, retention, or nocturia.  Musculoskeletal: Denies numbness, tingling or loss of motor function except as above  Integument: Denies rash, lesion or ulceration   Neurologic: Denies headache or focal weakness, deficits  Heme: Denies spontaneous or excessive bleeding, epistaxis, hematuria, melena, fatigue, enlarged or tender lymph nodes.      All other pertinent positives and negatives as noted above in HPI.    Physical Exam: 81 y.o. female    Vitals:    07/12/21 1103   Temp: 97.8 °F (36.6 °C)   Weight: 73.9 kg (163 lb)   Height: 157.5 cm (62\")       General:  Patient is awake and alert.  Appears in no acute distress or discomfort.    Psych:  Affect and demeanor are appropriate.    Eyes:  Conjunctiva and sclera appear grossly normal.  Eyes track well and EOM seem to be intact.    Ears:  No gross abnormalities.  Hearing adequate for the exam.    Cardiovascular:  Regular rate and rhythm.    Lungs:  Good chest expansion.  Breathing unlabored.    Lymph:  No palpable masses or adenopathy in the affected extremity    Musculoskeletal/Extremities:    Right upper extremity: Significant limited motion forward flexion 70 degrees abduction 70 degrees.  Slightly increased passive range of motion with abduction to about 90 forward flexion to about 90.  Patient is able to reach her mouth is obliteration the back of her head.  Sensation tact distally light touch.  Patient does have difficulty reaching back pocket.  Difficult to fully assess rotator cuff strength.         Radiology:       3 views of the right shoulder taken reviewed including AP, scapular Y and axillary view to evaluate the patient's complaint/s.    Imaging demonstrates severe " glenohumeral joint osteoarthritis.  There is osteophyte formation along the inferior aspect of the humeral head.  Positive bone sclerosis.  No acute fractures are appreciated.  Slight elevation of the humeral head in reference to the glenoid.     No imaging for comparison.    Assessment: Right shoulder osteoarthritis, rotator cuff tendinopathy.      Plan:      I explained the condition to the patient and given her limited motion and pain retention try conservative or surgical treatment options.  Patient is now any surgery at this time.  I do think she would benefit from some therapy and we can try an injection to help her pain.  I told the patient that this may not have great relief for her but we can attempt this.  Also anti-inflammatory for symptom management.  Patient is okay with this and she does not want any surgery at this time.  We will do a joint injection today and refer for physical therapy.           We will plan for follow up as needed.    All questions were answered.  Patient understands and agrees with the plan.    Kal Mcghee MD    07/12/2021    CC to Trang Tucker MD

## 2021-07-19 ENCOUNTER — TELEPHONE (OUTPATIENT)
Dept: ORTHOPEDIC SURGERY | Facility: CLINIC | Age: 81
End: 2021-07-19

## 2021-07-19 NOTE — TELEPHONE ENCOUNTER
Provider: DR. QUIGLEY  Caller: LON SANFORD  Relationship to Patient: SELF     Phone Number: [phone]  Reason for Call: RT SHOULDER PAIN  When was the patient last seen: 7-12-21    PATIENT WOULD LIKE A CALL BACK TO DISCUSS CORTISONE INJECTION GIVEN ON 7-12-21. PATIENT STATED IT HELPED VERY LITTLE & WOULD LIKE TO TALK ABOUT OPTIONS.

## 2021-07-19 NOTE — TELEPHONE ENCOUNTER
I called and spoke with pt. Informed her according to your LOV note you didn't think the shot was going to give her much relief. I asked if she had started PT and she said no. I informed her this would be the next step. I have faxed her PT order to [facility] fax # [phone] and told her to call them so she can get scheduled there. She gave verbal understanding      LOV 7/12/2021      PLAN: I explained the condition to the patient and given her limited motion and pain retention try conservative or surgical treatment options.  Patient is now any surgery at this time.  I do think she would benefit from some therapy and we can try an injection to help her pain.  I told the patient that this may not have great relief for her but we can attempt this.  Also anti-inflammatory for symptom management.  Patient is okay with this and she does not want any surgery at this time.  We will do a joint injection today and refer for physical therapy.

## 2022-01-13 NOTE — ANESTHESIA PREPROCEDURE EVALUATION
Anesthesia Evaluation     Patient summary reviewed and Nursing notes reviewed   NPO Solid Status: > 8 hours  NPO Liquid Status: > 2 hours           Airway   Neck ROM: full  No difficulty expected  Dental      Comment: Caps, implants    Pulmonary     breath sounds clear to auscultation  (+) sleep apnea,   Cardiovascular   Exercise tolerance: good (4-7 METS)    ECG reviewed  PT is on anticoagulation therapy  Patient on routine beta blocker and Beta blocker given within 24 hours of surgery  Rhythm: regular    (+) hypertension, past MI  >12 months, dysrhythmias (paroxysmal AF, currently sinus with PVCs) Atrial Fib, PVC, CHF (perserved EF per cardiology outside note), hyperlipidemia,   (-) orthopnea, PND      Neuro/Psych  GI/Hepatic/Renal/Endo    (+)  GERD,  diabetes mellitus,     Musculoskeletal     Abdominal    Substance History      OB/GYN          Other   (+) arthritis                   Anesthesia Plan    ASA 3     general     intravenous induction   Anesthetic plan and risks discussed with patient.      
DISPLAY PLAN FREE TEXT
DISPLAY PLAN FREE TEXT

## 2025-02-17 ENCOUNTER — OFFICE VISIT (OUTPATIENT)
Dept: ORTHOPEDIC SURGERY | Facility: CLINIC | Age: 85
End: 2025-02-17
Payer: MEDICARE

## 2025-02-17 VITALS — BODY MASS INDEX: 31.63 KG/M2 | TEMPERATURE: 97.9 F | HEIGHT: 62 IN | WEIGHT: 171.9 LBS

## 2025-02-17 DIAGNOSIS — R52 PAIN: Primary | ICD-10-CM

## 2025-02-17 DIAGNOSIS — M17.12 PRIMARY OSTEOARTHRITIS OF LEFT KNEE: ICD-10-CM

## 2025-02-17 RX ORDER — HYDROXYUREA 500 MG/1
500 CAPSULE ORAL DAILY
COMMUNITY
Start: 2025-02-03

## 2025-02-17 RX ORDER — ALLOPURINOL 100 MG/1
100 TABLET ORAL DAILY
COMMUNITY
Start: 2024-04-01 | End: 2025-04-01

## 2025-02-17 RX ORDER — LEVOTHYROXINE SODIUM 75 UG/1
75 TABLET ORAL DAILY
COMMUNITY
Start: 2024-12-10 | End: 2025-12-10

## 2025-02-17 RX ORDER — SPIRONOLACTONE 25 MG/1
TABLET ORAL
COMMUNITY
Start: 2025-02-16

## 2025-02-17 RX ORDER — BLOOD SUGAR DIAGNOSTIC
STRIP MISCELLANEOUS
COMMUNITY
Start: 2024-12-16

## 2025-02-17 RX ORDER — METOPROLOL SUCCINATE 25 MG/1
25 TABLET, EXTENDED RELEASE ORAL DAILY
COMMUNITY
Start: 2024-09-09

## 2025-02-17 RX ORDER — METHYLPREDNISOLONE ACETATE 80 MG/ML
80 INJECTION, SUSPENSION INTRA-ARTICULAR; INTRALESIONAL; INTRAMUSCULAR; SOFT TISSUE
Status: COMPLETED | OUTPATIENT
Start: 2025-02-17 | End: 2025-02-17

## 2025-02-17 RX ORDER — ASPIRIN 81 MG/1
81 TABLET ORAL DAILY
COMMUNITY
Start: 2024-09-09

## 2025-02-17 RX ORDER — LEVOTHYROXINE SODIUM 50 UG/1
TABLET ORAL
COMMUNITY
Start: 2024-12-05

## 2025-02-17 RX ORDER — PANTOPRAZOLE SODIUM 40 MG/1
40 TABLET, DELAYED RELEASE ORAL DAILY
COMMUNITY
Start: 2024-09-16

## 2025-02-17 RX ORDER — PEN NEEDLE, DIABETIC 31 G X1/4"
NEEDLE, DISPOSABLE MISCELLANEOUS
COMMUNITY
Start: 2024-12-26

## 2025-02-17 RX ORDER — AMLODIPINE BESYLATE 10 MG/1
TABLET ORAL
COMMUNITY
Start: 2025-01-26

## 2025-02-17 RX ORDER — TERAZOSIN 2 MG/1
CAPSULE ORAL
COMMUNITY
Start: 2025-01-30

## 2025-02-17 RX ORDER — INSULIN GLARGINE 100 [IU]/ML
INJECTION, SOLUTION SUBCUTANEOUS
COMMUNITY
Start: 2024-10-29

## 2025-02-17 RX ORDER — DIPHENOXYLATE HYDROCHLORIDE AND ATROPINE SULFATE 2.5; .025 MG/1; MG/1
1 TABLET ORAL
COMMUNITY

## 2025-02-17 RX ORDER — METRONIDAZOLE 7.5 MG/G
GEL TOPICAL
COMMUNITY
Start: 2024-03-25 | End: 2025-03-25

## 2025-02-17 RX ADMIN — METHYLPREDNISOLONE ACETATE 80 MG: 80 INJECTION, SUSPENSION INTRA-ARTICULAR; INTRALESIONAL; INTRAMUSCULAR; SOFT TISSUE at 15:30

## 2025-02-17 NOTE — PROGRESS NOTES
Patient: Kyler Horne  YOB: 1940 85 y.o. female  Medical Record Number: [id number]    Chief Complaints:   Chief Complaint   Patient presents with   • Left Knee - Initial Evaluation       History of Present Illness:Kyler Horne is a 85 y.o. female who presents as a new patient both myself as well as to the practice since it has been well over 3 years since she was seen last.  She is here today with complaints of increased left knee pain.  The patient has had pain off and on through the years it definitely has gotten worse over the last several weeks especially with walking.  She denies any injury    Allergies:   Allergies   Allergen Reactions   • Ciprofloxacin Diarrhea   • Clavulanic Acid Itching   • Hydrocodone GI Intolerance       Medications:   Current Outpatient Medications   Medication Sig Dispense Refill   • Acetaminophen (TYLENOL EXTRA STRENGTH PO) Take 1,000 mg by mouth As Needed.     • allopurinol (ZYLOPRIM) 100 MG tablet Take 1 tablet by mouth Daily.     • amLODIPine (NORVASC) 10 MG tablet      • aspirin 81 MG EC tablet Take 1 tablet by mouth Daily.     • BIOTIN PO Take 1 tablet by mouth Daily.     • busPIRone (BUSPAR) 5 MG tablet Take 1 tablet by mouth 3 (Three) Times a Day As Needed.     • carvedilol (COREG) 3.125 MG tablet Take 1 tablet by mouth 2 (Two) Times a Day With Meals.     • cephalexin (Keflex) 500 MG capsule Take all 4 caps 1 hour prior to procedure 4 capsule 2   • colesevelam (WELCHOL) 625 MG tablet Take 1 tablet by mouth Daily.     • Cyanocobalamin (VITAMIN B 12 PO) Take 3,000 mcg by mouth Daily. HELD FOR OR     • FIBER SELECT GUMMIES PO Take 5 g by mouth As Needed.     • glucose blood (Accu-Chek Yari Plus) test strip USE 1 STRIP THREE TIMES A DAY     • hydroCHLOROthiazide (HYDRODIURIL) 25 MG tablet Take 1 tablet by mouth Daily.     • HYDROmorphone (DILAUDID) 2 MG tablet Take 1 tablet by mouth Every 4 (Four) Hours As Needed for Moderate Pain  or Severe Pain  for up to 40  doses. 40 tablet 0   • hydroxyurea (HYDREA) 500 MG capsule Take 1 capsule by mouth Daily.     • [employer] LANCETS THIN 26G misc USE THREE TIMES A DAY TO TEST BLOOD SUGAR     • [employer] Pen Needles 31G X 6 MM misc      • Lantus SoloStar 100 UNIT/ML injection pen      • levothyroxine (SYNTHROID, LEVOTHROID) 50 MCG tablet      • levothyroxine (SYNTHROID, LEVOTHROID) 75 MCG tablet Take 1 tablet by mouth Daily.     • metoprolol succinate XL (TOPROL-XL) 25 MG 24 hr tablet Take 1 tablet by mouth Daily.     • metroNIDAZOLE (METROGEL) 0.75 % gel Apply  topically to the appropriate area as directed.     • Multiple Vitamins-Minerals (ADULT ONE DAILY GUMMIES PO) Take 2 tablets by mouth Daily. HELD FOR OR     • multivitamin (THERAGRAN) tablet tablet Take 1 tablet by mouth.     • nitrofurantoin, macrocrystal-monohydrate, (MACROBID) 100 MG capsule Take 1 capsule by mouth 2 (Two) Times a Day. (Patient taking differently: Take 1 capsule by mouth 2 (Two) Times a Day. x7 days per pharmacy) 14 capsule 0   • ondansetron (ZOFRAN) 4 MG tablet Take 1 tablet by mouth Every 6 (Six) Hours As Needed for Nausea or Vomiting for up to 10 doses. 10 tablet 0   • pantoprazole (PROTONIX) 40 MG EC tablet Take 1 tablet by mouth Daily.     • pravastatin (PRAVACHOL) 40 MG tablet Take 1 tablet by mouth Every Night.     • Probiotic Product (PROBIOTIC DAILY PO) Take 1 tablet by mouth Daily.     • rivaroxaban (XARELTO) 15 MG tablet Take 1 tablet by mouth Daily.     • spironolactone (ALDACTONE) 25 MG tablet      • terazosin (HYTRIN) 2 MG capsule      • vitamin d (CHOLECALIFEROL) 5000 units capsule Take 1 capsule by mouth Daily.       No current facility-administered medications for this visit.         The following portions of the patient's history were reviewed and updated as appropriate: allergies, current medications, past family history, past medical history, past social history, past surgical history and problem list.    Review of Systems:   14 point review  "of systems was performed. All systems negative except pertinent positives/negatives listed in HPI above    Physical Exam:   Vitals:    02/17/25 1515   Temp: 97.9 °F (36.6 °C)   Weight: 78 kg (171 lb 14.4 oz)   Height: 157.5 cm (62\")       General: A and O x 3, ASA, NAD    Clear no unusual lesions noted  Left knee patient has no appreciable effusion 120 degrees flexion neutral in extension positive Shahab negative Lockman calf soft and nontender she does have significant patellofemoral crepitus noted.  Calf is soft and nontender      Radiology:  Xrays 3views (ap,lateral, sunrise) left knee were ordered and reviewed today secondary to increased pain show bone-on-bone end-stage osteoarthritis with cyst and spur formation.  No compared to views available    Assessment/Plan: EndStage osteoarthritis left knee with increased pain    Patient and I discussed options including conservative measures versus total knee replacement, risks benefits alternatives discussed, she would like to try a left knee cortisone injection, declines physical therapy, Tylenol as needed, and I will see her back for follow-up in 3 months if needed    Large Joint Arthrocentesis: L knee  Date/Time: 2/17/2025 3:30 PM  Consent given by: patient  Site marked: site marked  Timeout: Immediately prior to procedure a time out was called to verify the correct patient, procedure, equipment, support staff and site/side marked as required   Supporting Documentation  Indications: pain and joint swelling   Procedure Details  Location: knee - L knee  Preparation: Patient was prepped and draped in the usual sterile fashion  Needle size: 22 G  Approach: anterolateral  Medications administered: 80 mg methylPREDNISolone acetate 80 MG/ML; 2 mL lidocaine (cardiac)  Patient tolerance: patient tolerated the procedure well with no immediate complications           Samara Yen, APRN  2/17/2025  "

## 2025-05-19 ENCOUNTER — OFFICE VISIT (OUTPATIENT)
Dept: ORTHOPEDIC SURGERY | Facility: CLINIC | Age: 85
End: 2025-05-19
Payer: MEDICARE

## 2025-05-19 VITALS — WEIGHT: 171.7 LBS | BODY MASS INDEX: 32.42 KG/M2 | HEIGHT: 61 IN | TEMPERATURE: 98 F

## 2025-05-19 DIAGNOSIS — M17.12 PRIMARY OSTEOARTHRITIS OF LEFT KNEE: Primary | ICD-10-CM

## 2025-05-19 RX ORDER — METHYLPREDNISOLONE ACETATE 80 MG/ML
80 INJECTION, SUSPENSION INTRA-ARTICULAR; INTRALESIONAL; INTRAMUSCULAR; SOFT TISSUE
Status: COMPLETED | OUTPATIENT
Start: 2025-05-19 | End: 2025-05-19

## 2025-05-19 RX ADMIN — METHYLPREDNISOLONE ACETATE 80 MG: 80 INJECTION, SUSPENSION INTRA-ARTICULAR; INTRALESIONAL; INTRAMUSCULAR; SOFT TISSUE at 14:26

## 2025-05-19 NOTE — PROGRESS NOTES
5/19/2025    Kyler Horne is here today for worsening knee pain. Pt has undergone injection of the knee in the past with good resolution of symptoms. Pt is requesting a repeat injection.     KNEE Injection Procedure Note:    Large Joint Arthrocentesis: L knee  Date/Time: 5/19/2025 2:26 PM  Consent given by: patient  Site marked: site marked  Timeout: Immediately prior to procedure a time out was called to verify the correct patient, procedure, equipment, support staff and site/side marked as required   Supporting Documentation  Indications: pain and joint swelling   Procedure Details  Location: knee - L knee  Preparation: Patient was prepped and draped in the usual sterile fashion  Needle size: 22 G  Approach: anterolateral  Medications administered: 80 mg methylPREDNISolone acetate 80 MG/ML; 2 mL lidocaine (cardiac)  Patient tolerance: patient tolerated the procedure well with no immediate complications      At the conclusion of the injection I discussed the importance of continued quad strengthening exercises on a daily basis. I will see the patient back if the symptoms should fail to improve or worsen.    Samara Yen, APRN  5/19/2025

## 2025-06-24 ENCOUNTER — TELEPHONE (OUTPATIENT)
Dept: ORTHOPEDIC SURGERY | Facility: CLINIC | Age: 85
End: 2025-06-24
Payer: MEDICARE

## 2025-06-24 NOTE — TELEPHONE ENCOUNTER
After speaking with Dr. Mcghee I stated to the patient that he does not see hands but we can put in a referral to a hand specialist. Patient stated that she has a nerve study scheduled in August and she will get an appointment with a hand specialist once it is complete.

## 2025-06-24 NOTE — TELEPHONE ENCOUNTER
Provider: DR QUIGLEY     Caller: LON SANOFRD     Relationship to Patient: PATIENT     Phone Number: [phone]    Reason for Call: RIGHT HAND - THUMB, APPROXIMATELY ONE MONTH AGO PATIENT'S RT THUMB BECAME NUMB     When was the patient last seen: 07/12/2021 - RIGHT SHOULDER PAIN, RECEIVED OMAR INJECTION       PATIENT'S PCP AT [facility] HAS ORDERED A NERVE STUDY TO BE PERFORMED AT [facility] IN AUGUST 2025.  PATIENT CALLED TO SCHEDULE WITH DR GALINDO THINKING THE RIGHT SHOULDER MAY BE CAUSE THE NUMBNESS IN HER RIGHT THUMB.  DR QUIGLEY WOULD TYPICALLY NOT SEE FOR DX RIGHT THUMB NUMBNESS - PLEASE ADVISE     PATIENT IS ESTABLISHED WITH DR MCADAMS AND TEAM AS WELL

## 2025-07-23 NOTE — TELEPHONE ENCOUNTER
--DO NOT REPLY - Sent from PACT - If sent to wrong pool, reroute to P [facility] Reroute pool --    Message Type:  Refill Medication   Is the medication pended:Yes  Medication name: levothyroxine 50 MCG tablet  Message: Patient needs a medication refill. Please follow up. Thank you   Preferred pharmacy verified, and selected.  [facility] [address] [place] [address]  Call Back #: [phone]  Can a detailed message be left?  Yes - Voicemail   Is the patient OUT of Medication?  Yes: Working Hours: route as HIGH priority according to KB. Patient has been advised the message will be reviewed within 1 business dayCopied from CRM #[id number]. Topic: MW Medication/Rx - MW Rx Refill  >> Jul 23, 2025  1:19 PM Osorio CRUZ wrote:  Jeanna called to request a medication refill and has meds for the next 2-3 business days    New Request/No Encounter found   >ADDED NOTE / CREATED CUSTOM CLINICAL CALL  >Reason for call 'Refill Request'  >Sent for Med Refill support.   Hold for rbb

## 2025-08-22 ENCOUNTER — OFFICE VISIT (OUTPATIENT)
Dept: ORTHOPEDIC SURGERY | Facility: CLINIC | Age: 85
End: 2025-08-22
Payer: MEDICARE

## 2025-08-22 VITALS — TEMPERATURE: 98.7 F | BODY MASS INDEX: 31.03 KG/M2 | HEIGHT: 62 IN | WEIGHT: 168.6 LBS

## 2025-08-22 DIAGNOSIS — M17.12 PRIMARY OSTEOARTHRITIS OF LEFT KNEE: Primary | ICD-10-CM

## 2025-08-22 RX ORDER — SODIUM BICARBONATE 325 MG/1
325 TABLET ORAL 4 TIMES DAILY
COMMUNITY

## 2025-08-22 RX ORDER — METHYLPREDNISOLONE ACETATE 80 MG/ML
80 INJECTION, SUSPENSION INTRA-ARTICULAR; INTRALESIONAL; INTRAMUSCULAR; SOFT TISSUE
Status: COMPLETED | OUTPATIENT
Start: 2025-08-22 | End: 2025-08-22

## 2025-08-22 RX ORDER — ALLOPURINOL 200 MG/1
TABLET ORAL
COMMUNITY

## 2025-08-22 RX ADMIN — METHYLPREDNISOLONE ACETATE 80 MG: 80 INJECTION, SUSPENSION INTRA-ARTICULAR; INTRALESIONAL; INTRAMUSCULAR; SOFT TISSUE at 13:26

## (undated) DEVICE — 3M™ IOBAN™ 2 ANTIMICROBIAL INCISE DRAPE 6640EZ: Brand: IOBAN™ 2

## (undated) DEVICE — OCCLUSIVE GAUZE STRIP,3% BISMUTH TRIBROMOPHENATE IN PETROLATUM BLEND: Brand: XEROFORM

## (undated) DEVICE — GLV SURG SENSICARE W/ALOE PF LF 8 STRL

## (undated) DEVICE — SPNG GZ WOVN 4X4IN 12PLY 10/BX STRL

## (undated) DEVICE — PREP SOL POVIDONE/IODINE BT 4OZ

## (undated) DEVICE — ANTIBACTERIAL UNDYED BRAIDED (POLYGLACTIN 910), SYNTHETIC ABSORBABLE SUTURE: Brand: COATED VICRYL

## (undated) DEVICE — SUT PDS 1 CT1 36IN Z347H

## (undated) DEVICE — SUT VIC 1 CT1 36IN J947H

## (undated) DEVICE — GLV SURG SENSICARE MICRO PF LF 7 STRL

## (undated) DEVICE — SUT VIC 0 CT1 36IN J946H

## (undated) DEVICE — GLV SURG SENSICARE PI PF LF 7 GRN STRL

## (undated) DEVICE — MAT FLR ABSORBENT LG 4FT 10 2.5FT

## (undated) DEVICE — GLV SURG SENSICARE W/ALOE PF LF 7.5 STRL

## (undated) DEVICE — APPL DURAPREP IODOPHOR APL 26ML

## (undated) DEVICE — PREMIUM WET SKIN PREP TRAY: Brand: MEDLINE INDUSTRIES, INC.

## (undated) DEVICE — DRSNG GZ PETROLTM XEROFORM CURAD 1X8IN STRL

## (undated) DEVICE — HANDPIECE SET WITH COAXIAL HIGH FLOW TIP AND SUCTION TUBE: Brand: INTERPULSE

## (undated) DEVICE — SYR LUERLOK 30CC

## (undated) DEVICE — PK HIP TOTL 40

## (undated) DEVICE — SOL NACL 0.9PCT 100ML SGL

## (undated) DEVICE — 3M™ IOBAN™ 2 ANTIMICROBIAL INCISE DRAPE 6650EZ: Brand: IOBAN™ 2

## (undated) DEVICE — PICO SINGLE USE NEGATIVE PRESSURE WOUND THERAPY SYSTEM 10CM X 30CM 4IN. X 12IN.: Brand: PICO

## (undated) DEVICE — GLV SURG PREMIERPRO ORTHO LTX PF SZ7.5 BRN

## (undated) DEVICE — DRSNG SURESITE WNDW 4X4.5

## (undated) DEVICE — STPLR SKIN VISISTAT WD 35CT

## (undated) DEVICE — GLV SURG SENSICARE GREEN W/ALOE PF LF 7 STRL

## (undated) DEVICE — DRSNG BRDR MEPILEX P/OP SIL 4X8IN

## (undated) DEVICE — ENCORE® LATEX ORTHO SIZE 7.5, STERILE LATEX POWDER-FREE SURGICAL GLOVE: Brand: ENCORE

## (undated) DEVICE — SPNG LAP 18X18IN LF STRL PK/5

## (undated) DEVICE — REFLECTION FLEXIBLE DRILL 25MM: Brand: REFLECTION